# Patient Record
Sex: FEMALE | Race: WHITE
[De-identification: names, ages, dates, MRNs, and addresses within clinical notes are randomized per-mention and may not be internally consistent; named-entity substitution may affect disease eponyms.]

---

## 2017-07-21 ENCOUNTER — HOSPITAL ENCOUNTER (INPATIENT)
Dept: HOSPITAL 17 - PHED | Age: 77
LOS: 2 days | Discharge: HOME | DRG: 378 | End: 2017-07-23
Attending: HOSPITALIST | Admitting: HOSPITALIST
Payer: COMMERCIAL

## 2017-07-21 VITALS
DIASTOLIC BLOOD PRESSURE: 55 MMHG | OXYGEN SATURATION: 95 % | RESPIRATION RATE: 20 BRPM | SYSTOLIC BLOOD PRESSURE: 118 MMHG | HEART RATE: 94 BPM

## 2017-07-21 VITALS
DIASTOLIC BLOOD PRESSURE: 62 MMHG | OXYGEN SATURATION: 98 % | RESPIRATION RATE: 20 BRPM | SYSTOLIC BLOOD PRESSURE: 121 MMHG | HEART RATE: 88 BPM

## 2017-07-21 VITALS — WEIGHT: 155.65 LBS | HEIGHT: 64 IN | BODY MASS INDEX: 26.57 KG/M2

## 2017-07-21 VITALS
OXYGEN SATURATION: 99 % | SYSTOLIC BLOOD PRESSURE: 98 MMHG | TEMPERATURE: 97.8 F | HEART RATE: 138 BPM | RESPIRATION RATE: 20 BRPM | DIASTOLIC BLOOD PRESSURE: 55 MMHG

## 2017-07-21 VITALS
HEART RATE: 92 BPM | SYSTOLIC BLOOD PRESSURE: 118 MMHG | RESPIRATION RATE: 20 BRPM | DIASTOLIC BLOOD PRESSURE: 55 MMHG | OXYGEN SATURATION: 98 %

## 2017-07-21 DIAGNOSIS — C34.90: ICD-10-CM

## 2017-07-21 DIAGNOSIS — R74.0: ICD-10-CM

## 2017-07-21 DIAGNOSIS — I05.0: ICD-10-CM

## 2017-07-21 DIAGNOSIS — Z82.49: ICD-10-CM

## 2017-07-21 DIAGNOSIS — E03.9: ICD-10-CM

## 2017-07-21 DIAGNOSIS — E78.5: ICD-10-CM

## 2017-07-21 DIAGNOSIS — I10: ICD-10-CM

## 2017-07-21 DIAGNOSIS — I48.91: ICD-10-CM

## 2017-07-21 DIAGNOSIS — I65.29: ICD-10-CM

## 2017-07-21 DIAGNOSIS — Z86.010: ICD-10-CM

## 2017-07-21 DIAGNOSIS — D64.9: ICD-10-CM

## 2017-07-21 DIAGNOSIS — I25.10: ICD-10-CM

## 2017-07-21 DIAGNOSIS — I73.9: ICD-10-CM

## 2017-07-21 DIAGNOSIS — I25.2: ICD-10-CM

## 2017-07-21 DIAGNOSIS — Z95.5: ICD-10-CM

## 2017-07-21 DIAGNOSIS — G40.909: ICD-10-CM

## 2017-07-21 DIAGNOSIS — Z80.0: ICD-10-CM

## 2017-07-21 DIAGNOSIS — F41.9: ICD-10-CM

## 2017-07-21 DIAGNOSIS — I24.8: ICD-10-CM

## 2017-07-21 DIAGNOSIS — K92.2: Primary | ICD-10-CM

## 2017-07-21 DIAGNOSIS — K22.70: ICD-10-CM

## 2017-07-21 DIAGNOSIS — K31.819: ICD-10-CM

## 2017-07-21 DIAGNOSIS — F17.210: ICD-10-CM

## 2017-07-21 DIAGNOSIS — K64.8: ICD-10-CM

## 2017-07-21 DIAGNOSIS — K21.9: ICD-10-CM

## 2017-07-21 DIAGNOSIS — F32.9: ICD-10-CM

## 2017-07-21 DIAGNOSIS — Z85.828: ICD-10-CM

## 2017-07-21 LAB
ALP SERPL-CCNC: 143 U/L (ref 45–117)
ALT SERPL-CCNC: 28 U/L (ref 10–53)
ANION GAP SERPL CALC-SCNC: 9 MEQ/L (ref 5–15)
APTT BLD: 25.9 SEC (ref 24.3–30.1)
AST SERPL-CCNC: 24 U/L (ref 15–37)
BASOPHILS # BLD AUTO: 0 TH/MM3 (ref 0–0.2)
BASOPHILS NFR BLD: 0.4 % (ref 0–2)
BILIRUB SERPL-MCNC: (no result) MG/DL (ref 0.2–1)
BUN SERPL-MCNC: 16 MG/DL (ref 7–18)
CHLORIDE SERPL-SCNC: 102 MEQ/L (ref 98–107)
CK SERPL-CCNC: 77 U/L (ref 26–192)
EOSINOPHIL # BLD: 0 TH/MM3 (ref 0–0.4)
EOSINOPHIL NFR BLD: 0.8 % (ref 0–4)
ERYTHROCYTE [DISTWIDTH] IN BLOOD BY AUTOMATED COUNT: 13.9 % (ref 11.6–17.2)
GFR SERPLBLD BASED ON 1.73 SQ M-ARVRAT: 75 ML/MIN (ref 89–?)
HCO3 BLD-SCNC: 27.2 MEQ/L (ref 21–32)
HCT VFR BLD CALC: 32.3 % (ref 35–46)
HEMO FLAGS: (no result)
INR PPP: 0.9 RATIO
LYMPHOCYTES # BLD AUTO: 0.8 TH/MM3 (ref 1–4.8)
LYMPHOCYTES NFR BLD AUTO: 13.8 % (ref 9–44)
MAGNESIUM SERPL-MCNC: 1.8 MG/DL (ref 1.5–2.5)
MCH RBC QN AUTO: 29.2 PG (ref 27–34)
MCHC RBC AUTO-ENTMCNC: 32.3 % (ref 32–36)
MCV RBC AUTO: 90.5 FL (ref 80–100)
MONOCYTES NFR BLD: 8.1 % (ref 0–8)
NEUTROPHILS # BLD AUTO: 4.3 TH/MM3 (ref 1.8–7.7)
NEUTROPHILS NFR BLD AUTO: 76.9 % (ref 16–70)
PLATELET # BLD: 168 TH/MM3 (ref 150–450)
POTASSIUM SERPL-SCNC: 3.2 MEQ/L (ref 3.5–5.1)
PROTHROMBIN TIME: 10.3 SEC (ref 9.8–11.6)
RBC # BLD AUTO: 3.57 MIL/MM3 (ref 4–5.3)
SODIUM SERPL-SCNC: 138 MEQ/L (ref 136–145)
WBC # BLD AUTO: 5.5 TH/MM3 (ref 4–11)

## 2017-07-21 PROCEDURE — 85730 THROMBOPLASTIN TIME PARTIAL: CPT

## 2017-07-21 PROCEDURE — 83690 ASSAY OF LIPASE: CPT

## 2017-07-21 PROCEDURE — 93306 TTE W/DOPPLER COMPLETE: CPT

## 2017-07-21 PROCEDURE — 84484 ASSAY OF TROPONIN QUANT: CPT

## 2017-07-21 PROCEDURE — 80053 COMPREHEN METABOLIC PANEL: CPT

## 2017-07-21 PROCEDURE — 85014 HEMATOCRIT: CPT

## 2017-07-21 PROCEDURE — 84443 ASSAY THYROID STIM HORMONE: CPT

## 2017-07-21 PROCEDURE — 83880 ASSAY OF NATRIURETIC PEPTIDE: CPT

## 2017-07-21 PROCEDURE — 85018 HEMOGLOBIN: CPT

## 2017-07-21 PROCEDURE — 85610 PROTHROMBIN TIME: CPT

## 2017-07-21 PROCEDURE — C9113 INJ PANTOPRAZOLE SODIUM, VIA: HCPCS

## 2017-07-21 PROCEDURE — 85025 COMPLETE CBC W/AUTO DIFF WBC: CPT

## 2017-07-21 PROCEDURE — 82977 ASSAY OF GGT: CPT

## 2017-07-21 PROCEDURE — 82550 ASSAY OF CK (CPK): CPT

## 2017-07-21 PROCEDURE — 83735 ASSAY OF MAGNESIUM: CPT

## 2017-07-21 PROCEDURE — 71020: CPT

## 2017-07-21 PROCEDURE — 83520 IMMUNOASSAY QUANT NOS NONAB: CPT

## 2017-07-21 PROCEDURE — 93005 ELECTROCARDIOGRAM TRACING: CPT

## 2017-07-21 NOTE — PD
HPI


Chief Complaint:  GI Complaint


Time Seen by Provider:  23:07


Travel History


International Travel<30 days:  No


Contact w/Intl Traveler<30days:  No


Traveled to known affect area:  No





History of Present Illness


HPI


The patient is a 77-year-old female that complains of black bowel movements for 

one day.  The patient states she knows there are bloody because there is 

sometimes some redness mixed within the bowel movements.  She does have a 

history of chronic anemia.  She does have a history of adenocarcinoma of the 

lung and has gotten radiation therapy for this.  She is on Plavix because of 

bad circulation in the legs.  She denies any previous history of atrial 

fibrillation.  The patient also complains of a chest tightness located in the 

mid sternal region of her chest associated with shortness of breath and lasting 

about 20 minutes.  She denies any nausea, diaphoresis or radiation of the pain.

  Dr. Rivera is her cardiologist.  She is a [employer] patient of Dr. Shayan Lyles.





PFSH


Past Medical History


Hx Anticoagulant Therapy:  Yes (PLAVIX)


Arthritis:  Yes (joints)


Anxiety:  Yes


Depression:  Yes


Heart Rhythm Problems:  No


Cancer:  Yes (SKIN CANCER FACIAL AREA, left lung cancer)


Cardiac Catheterization:  No


Cardiovascular Problems:  Yes (MI, HTN, CHOL)


High Cholesterol:  Yes


Chemotherapy:  Yes


Chest Pain:  Yes


Congestive Heart Failure:  No


Coronary Artery Disease:  Yes (MI )


Diabetes:  No


Diminished Hearing:  No


Endocrine:  Yes


Gastrointestinal Disorders:  Yes


GERD:  Yes


Hepatitis:  No


Hiatal Hernia:  Yes (REFLUX)


Hypertension:  Yes


Immune Disorder:  No


Musculoskeletal:  Yes


Neurologic:  Yes


Psychiatric:  Yes


Reproductive:  No


Respiratory:  Yes


Myocardial Infarction:  Yes (X1)


Radiation Therapy:  Yes (currently for lung cancer)


Seizures:  Yes


Thyroid Disease:  Yes (HYPO)


Menopausal:  Yes


:  4


Para:  3


Miscarriage:  1





Past Surgical History


Abdominal Surgery:  Yes (STENTS PLACED TO STOMACH AS STATED 2001 R/T PVD)


Cardiac Surgery:  Yes (ANGIO-PLASTY)


Coronary Artery Bypass Graft:  No


Ear Surgery:  No


Endocrine Surgery:  No


Eye Surgery:  No


Genitourinary Surgery:  No


Gynecologic Surgery:  No


Joint Replacement:  No


Neurologic Surgery:  Yes (BRAIN ANEURYSM REPAIR)


Oral Surgery:  No


Pacemaker:  No


Thoracic Surgery:  No


Other Surgery:  Yes (ANEURYSM CLIP IN BRAIN. )





Social History


Alcohol Use:  Yes (2-3 DRINKS ONCE WEEKLY)


Tobacco Use:  Yes (1/2 ppd)


Substance Use:  No





Allergies-Medications


(Allergen,Severity, Reaction):  


Coded Allergies:  


     No Known Allergies (Verified , 17)


Reported Meds & Prescriptions





Reported Meds & Active Scripts


Active


Protonix (Pantoprazole Sodium) 40 Mg Tab 40 Mg PO BID 30 Days


Reported


Vitamin C (Ascorbic Acid) 1,000 Mg Tab.chew 1,000 Mg PO DAILY


Vitamin D-3 (Cholecalciferol) 1,000 Unit Cap 1,000 Tab PO DAILY


Multi For Her (Multiple Vitamins W/ Minerals) 1 Tab Tab 1 Tab PO DAILY


Dilantin (Phenytoin Extended) 100 Mg Cap 400 Mg PO HS


Lipitor (Atorvastatin Calcium) 80 Mg Tab 80 Mg PO HS


Zetia (Ezetimibe) 10 Mg Tab 10 Mg PO DAILY


Plavix (Clopidogrel Bisulfate) 75 Mg Tab 75 Mg PO DAILY


Levothyroxine (Levothyroxine Sodium) 150 Mcg Tab 150 Mcg PO DAILY


Norvasc (Amlodipine Besylate) 10 Mg Tab 10 Mg PO DAILY








Review of Systems


Except as stated in HPI:  all other systems reviewed are Neg





Physical Exam


Narrative


GENERAL: The patient is alert, oriented 3 in no apparent distress at this 

time.  Initially, the vital signs showed atrial fibrillation with RVR and a 

rate of 138 with blood pressure 98/55.  Later the patient converted into sinus 

rhythm with a rate of 88 and blood pressure 118/55.


SKIN: Focused skin assessment warm/dry.


HEAD: Atraumatic. Normocephalic. 


EYES: Pupils equal and round. No scleral icterus. No injection or drainage. 


ENT: No nasal bleeding or discharge.  Mucous membranes pink and moist.


NECK: Trachea midline. No JVD. 


CARDIOVASCULAR: Initial atrial fibrillation with RVR which converted to sinus 

rhythm.  No murmur appreciated.


RESPIRATORY: No accessory muscle use. Clear to auscultation. Breath sounds 

equal bilaterally. 


GASTROINTESTINAL: Abdomen soft, non-tender, nondistended. Hepatic and splenic 

margins not palpable. 


MUSCULOSKELETAL: No obvious deformities. No clubbing.  No cyanosis.  No edema. 


NEUROLOGICAL: Awake and alert. No obvious cranial nerve deficits.  Motor 

grossly within normal limits. Normal speech.


PSYCHIATRIC: Appropriate mood and affect; insight and judgment normal.





Data


Data


Last Documented VS





Vital Signs








  Date Time  Temp Pulse Resp B/P Pulse Ox O2 Delivery O2 Flow Rate FiO2


 


17 23:54   20     


 


17 23:51  88  121/62 98 Nasal Cannula  


 


17 23:18 97.8       


 


17 23:15       2 








Orders





 Electrocardiogram (17:)


B-Type Natriuretic Peptide (17:)


Ckmb (Isoenzyme) Profile (17:28)


Complete Blood Count With Diff (17:28)


Comprehensive Metabolic Panel (17:)


Magnesium (Mg) (17:)


Prothrombin Time / Inr (Pt) (17:)


Act Partial Throm Time (Ptt) (17:)


Troponin I (17:)


Lipase (17:)


Ecg Monitoring (17:)


Iv Access Insert/Monitor (17:)


Oximetry (17:)


Oxygen Administration (17:)


Sodium Chloride 0.9% Flush (Ns Flush) (17 23:30)


Chest, Pa & Lat (17:28)


Potassium Chloride (Kcl) (17 00:45)


Admit Order (Ed Use Only) (17 00:51)


Pantoprazole Inj (Protonix Inj) (17 01:00)


Admit To Inpatient (17 )


Vital Signs (Adult) Q4H (17 00:49)


Activity Oob With Assistance (17 00:49)


Cardiac Monitor / Telemetry .CONTINUOUS (17 00:49)


Intake + Output WM.QSHIFT (17 00:49)


Diet Regular Basic (17 Breakfast)


Sodium Chlor 0.9% 1000 Ml Inj (Ns 1000 M (17 00:49)


Sodium Chloride 0.9% Flush (Ns Flush) (17 01:00)


Sodium Chloride 0.9% Flush (Ns Flush) (17 09:00)


Ondansetron Inj (Zofran Inj) (17 01:00)


Comprehensive Metabolic Panel (17 06:00)


Complete Blood Count With Diff (17 06:00)


Scd Bilateral/Knee High WM.BID (17 00:49)


Bony Bilateral/Knee High WM.QSHIFT (17 00:49)


Acetaminophen (Tylenol) (17 01:00)


Acetamin-Hydrocod 325-5 Mg (Norco  5-325 (17 01:00)


Morphine Inj (Morphine Inj) (17 01:00)


Docusate Sodium-Senna (Ileana-Colace) (17 09:00)


Magnesium Hydroxide Liq (Milk Of Magnesi (17 01:00)


Sennosides (Senokot) (17 01:00)


Bisacodyl Supp (Dulcolax Supp) (17 01:00)


Lactulose Liq (Lactulose Liq) (17 01:00)


Inpatient Certification (17 )


Atorvastatin (Lipitor) (17 21:00)


Phenytoin (Dilantin) (17 21:00)





Labs





 Laboratory Tests








Test 17





 23:30


 


White Blood Count 5.5 TH/MM3


 


Red Blood Count 3.57 MIL/MM3


 


Hemoglobin 10.4 GM/DL


 


Hematocrit 32.3 %


 


Mean Corpuscular Volume 90.5 FL


 


Mean Corpuscular Hemoglobin 29.2 PG


 


Mean Corpuscular Hemoglobin 32.3 %





Concent 


 


Red Cell Distribution Width 13.9 %


 


Platelet Count 168 TH/MM3


 


Mean Platelet Volume 7.4 FL


 


Neutrophils (%) (Auto) 76.9 %


 


Lymphocytes (%) (Auto) 13.8 %


 


Monocytes (%) (Auto) 8.1 %


 


Eosinophils (%) (Auto) 0.8 %


 


Basophils (%) (Auto) 0.4 %


 


Neutrophils # (Auto) 4.3 TH/MM3


 


Lymphocytes # (Auto) 0.8 TH/MM3


 


Monocytes # (Auto) 0.4 TH/MM3


 


Eosinophils # (Auto) 0.0 TH/MM3


 


Basophils # (Auto) 0.0 TH/MM3


 


CBC Comment DIFF FINAL 


 


Differential Comment  


 


Prothrombin Time 10.3 SEC


 


Prothromb Time International 0.9 RATIO





Ratio 


 


Activated Partial 25.9 SEC





Thromboplast Time 


 


Sodium Level 138 MEQ/L


 


Potassium Level 3.2 MEQ/L


 


Chloride Level 102 MEQ/L


 


Carbon Dioxide Level 27.2 MEQ/L


 


Anion Gap 9 MEQ/L


 


Blood Urea Nitrogen 16 MG/DL


 


Creatinine 0.75 MG/DL


 


Estimat Glomerular Filtration 75 ML/MIN





Rate 


 


Random Glucose 141 MG/DL


 


Calcium Level 8.2 MG/DL


 


Magnesium Level 1.8 MG/DL


 


Total Bilirubin LESS THAN 0.1





 MG/DL


 


Aspartate Amino Transf 24 U/L





(AST/SGOT) 


 


Alanine Aminotransferase 28 U/L





(ALT/SGPT) 


 


Alkaline Phosphatase 143 U/L


 


Total Creatine Kinase 77 U/L


 


Troponin I 0.05 NG/ML


 


B-Type Natriuretic Peptide 135 PG/ML


 


Total Protein 7.4 GM/DL


 


Albumin 3.3 GM/DL


 


Lipase 246 U/L











MDM


Medical Decision Making


Medical Screen Exam Complete:  Yes


Emergency Medical Condition:  Yes


Medical Record Reviewed:  Yes


Interpretation(s)


The initial EKG showed atrial fibrillation with a ventricular rate of 135.  The 

patient spontaneously converted into sinus rhythm with occasional PVCs and rate 

of 88.  The second EKG, the 1 with normal sinus rhythm showed no acute ST 

elevation or depression.  The patient also states her chest pain resolved when 

her rhythm converted to normal sinus rhythm.  The CBC shows a hemoglobin of 

10.4 and hematocrit of 32.3 with 77% neutrophils but is otherwise unremarkable.

  The complete metabolic profile shows potassium of 3.2, glucose 141, calcium 

8.2 with alkaline phosphatase 143 and albumen 3.3 but is otherwise 

unremarkable.  The BNP is 135 and the troponin I and total CK are normal.  The 

lipase is normal.  The coagulation profile is normal.  The chest x-ray shows no 

acute disease.


Differential Diagnosis


Anemia, upper GI bleed, acute coronary syndrome, atrial fibrillation with rapid 

ventricular response, electrolyte disorder,


Narrative Course


The patient has chest pain and associated with her atrial fibrillation.  This 

is the first time she has had atrial fibrillation.  She does have a mild 

hypokalemia as well.





Physician Communication


Physician Communication


I discussed the patient with Dr. Canada, the patient will be admitted to her as 

GI bleed with anemia and atrial fibrillation with RVR.





Diagnosis





 Primary Impression:  


 Upper GI bleed


 Additional Impressions:  


 Anemia


 Atrial fibrillation with RVR





Admitting Information


Admitting Physician Requests:  Admit








Luis Eduardo Huddleston MD 2017 23:38

## 2017-07-21 NOTE — RADRPT
EXAM DATE/TIME:  07/21/2017 23:33 

 

[facility] COMPARISON:     

CHEST PA & LAT, September 14, 2015, 20:54.  CHEST SINGLE AP, October 27, 2016, 15:08.

 

                     

INDICATIONS :     

Chest pain.

                     

 

MEDICAL HISTORY :     

None.          

 

SURGICAL HISTORY :     

None.   

 

ENCOUNTER:     

Initial                                        

 

ACUITY:     

1 day      

 

PAIN SCORE:     

7/10

 

LOCATION:     

Bilateral chest 

 

FINDINGS:     

There is slight scarring in the left lung base. Right lung is clear. Cardiomediastinal contours are s
table and satisfactory. Thoracic skeleton is grossly intact.

 

CONCLUSION:     No acute disease

 

 

 

 Da Lao MD on July 21, 2017 at 23:51           

Board Certified Radiologist.

 This report was verified electronically.

## 2017-07-22 VITALS
DIASTOLIC BLOOD PRESSURE: 47 MMHG | SYSTOLIC BLOOD PRESSURE: 94 MMHG | HEART RATE: 75 BPM | RESPIRATION RATE: 20 BRPM | OXYGEN SATURATION: 97 %

## 2017-07-22 VITALS
TEMPERATURE: 97.4 F | SYSTOLIC BLOOD PRESSURE: 150 MMHG | DIASTOLIC BLOOD PRESSURE: 58 MMHG | RESPIRATION RATE: 18 BRPM | HEART RATE: 79 BPM | OXYGEN SATURATION: 98 %

## 2017-07-22 VITALS
HEART RATE: 70 BPM | TEMPERATURE: 97 F | RESPIRATION RATE: 20 BRPM | OXYGEN SATURATION: 100 % | DIASTOLIC BLOOD PRESSURE: 56 MMHG | SYSTOLIC BLOOD PRESSURE: 134 MMHG

## 2017-07-22 VITALS
OXYGEN SATURATION: 100 % | TEMPERATURE: 97 F | HEART RATE: 71 BPM | DIASTOLIC BLOOD PRESSURE: 54 MMHG | SYSTOLIC BLOOD PRESSURE: 130 MMHG | RESPIRATION RATE: 18 BRPM

## 2017-07-22 VITALS
DIASTOLIC BLOOD PRESSURE: 71 MMHG | TEMPERATURE: 97.3 F | OXYGEN SATURATION: 95 % | HEART RATE: 81 BPM | SYSTOLIC BLOOD PRESSURE: 145 MMHG | RESPIRATION RATE: 18 BRPM

## 2017-07-22 VITALS
DIASTOLIC BLOOD PRESSURE: 54 MMHG | TEMPERATURE: 97.9 F | HEART RATE: 74 BPM | OXYGEN SATURATION: 98 % | RESPIRATION RATE: 16 BRPM | SYSTOLIC BLOOD PRESSURE: 125 MMHG

## 2017-07-22 VITALS
OXYGEN SATURATION: 98 % | HEART RATE: 73 BPM | RESPIRATION RATE: 20 BRPM | DIASTOLIC BLOOD PRESSURE: 52 MMHG | SYSTOLIC BLOOD PRESSURE: 113 MMHG

## 2017-07-22 VITALS — SYSTOLIC BLOOD PRESSURE: 108 MMHG | DIASTOLIC BLOOD PRESSURE: 72 MMHG

## 2017-07-22 VITALS — HEART RATE: 82 BPM

## 2017-07-22 VITALS — HEART RATE: 75 BPM

## 2017-07-22 LAB
ALP SERPL-CCNC: 127 U/L (ref 45–117)
ALT SERPL-CCNC: 23 U/L (ref 10–53)
ANION GAP SERPL CALC-SCNC: 6 MEQ/L (ref 5–15)
AST SERPL-CCNC: 23 U/L (ref 15–37)
BASOPHILS # BLD AUTO: 0 TH/MM3 (ref 0–0.2)
BASOPHILS NFR BLD: 0.9 % (ref 0–2)
BILIRUB SERPL-MCNC: 0.2 MG/DL (ref 0.2–1)
BUN SERPL-MCNC: 14 MG/DL (ref 7–18)
CHLORIDE SERPL-SCNC: 107 MEQ/L (ref 98–107)
EOSINOPHIL # BLD: 0 TH/MM3 (ref 0–0.4)
EOSINOPHIL NFR BLD: 0.8 % (ref 0–4)
ERYTHROCYTE [DISTWIDTH] IN BLOOD BY AUTOMATED COUNT: 13.6 % (ref 11.6–17.2)
GFR SERPLBLD BASED ON 1.73 SQ M-ARVRAT: 101 ML/MIN (ref 89–?)
HCO3 BLD-SCNC: 28.5 MEQ/L (ref 21–32)
HCT VFR BLD CALC: 28.6 % (ref 35–46)
HCT VFR BLD CALC: 28.8 % (ref 35–46)
HCT VFR BLD CALC: 29.8 % (ref 35–46)
HCT VFR BLD CALC: 30.2 % (ref 35–46)
HEMO FLAGS: (no result)
LYMPHOCYTES # BLD AUTO: 0.8 TH/MM3 (ref 1–4.8)
LYMPHOCYTES NFR BLD AUTO: 16.8 % (ref 9–44)
MCH RBC QN AUTO: 28.9 PG (ref 27–34)
MCHC RBC AUTO-ENTMCNC: 32.1 % (ref 32–36)
MCV RBC AUTO: 90 FL (ref 80–100)
MONOCYTES NFR BLD: 9.6 % (ref 0–8)
NEUTROPHILS # BLD AUTO: 3.5 TH/MM3 (ref 1.8–7.7)
NEUTROPHILS NFR BLD AUTO: 71.9 % (ref 16–70)
PLATELET # BLD: 160 TH/MM3 (ref 150–450)
POTASSIUM SERPL-SCNC: 4.1 MEQ/L (ref 3.5–5.1)
RBC # BLD AUTO: 3.31 MIL/MM3 (ref 4–5.3)
REVIEW FLAG: (no result)
SCAN/DIFF: (no result)
SODIUM SERPL-SCNC: 141 MEQ/L (ref 136–145)
WBC # BLD AUTO: 4.7 TH/MM3 (ref 4–11)

## 2017-07-22 RX ADMIN — PHENYTOIN SODIUM SCH MLS/HR: 50 INJECTION INTRAMUSCULAR; INTRAVENOUS at 17:33

## 2017-07-22 RX ADMIN — STANDARDIZED SENNA CONCENTRATE AND DOCUSATE SODIUM SCH TAB: 8.6; 5 TABLET, FILM COATED ORAL at 08:15

## 2017-07-22 RX ADMIN — PANTOPRAZOLE SCH MG: 40 TABLET, DELAYED RELEASE ORAL at 21:04

## 2017-07-22 RX ADMIN — PANTOPRAZOLE SODIUM SCH MG: 40 INJECTION, POWDER, FOR SOLUTION INTRAVENOUS at 13:16

## 2017-07-22 RX ADMIN — STANDARDIZED SENNA CONCENTRATE AND DOCUSATE SODIUM SCH TAB: 8.6; 5 TABLET, FILM COATED ORAL at 21:00

## 2017-07-22 RX ADMIN — PHENYTOIN SODIUM SCH MLS/HR: 50 INJECTION INTRAMUSCULAR; INTRAVENOUS at 01:01

## 2017-07-22 RX ADMIN — PANTOPRAZOLE SODIUM SCH MG: 40 INJECTION, POWDER, FOR SOLUTION INTRAVENOUS at 01:08

## 2017-07-22 RX ADMIN — Medication SCH ML: at 08:15

## 2017-07-22 RX ADMIN — PHENYTOIN SODIUM SCH MLS/HR: 50 INJECTION INTRAMUSCULAR; INTRAVENOUS at 13:16

## 2017-07-22 RX ADMIN — Medication SCH ML: at 21:05

## 2017-07-22 NOTE — PD.CONS
GI Consult


GI Consult








SEE FORMAL CONSULTATION DICTATED TODAY ALSO ([id number])


      


ASSESSMENT/PLAN:  





1. Melena-no BM's today


2. Anemia-stable


3. hx of gastric and small bowel AVM's


4. GAVE (watermelon stomach)


5. CP with A. Fib and elevated troponin 


6. Alcantara's esoph


7. Colon polyps


8. Elevated Alk PO4-not new





PLAN:


1. GGTP,AMA


2. Cont PPI


3. EGD with APC (cautery device) once stable from the cardiac standpoint. She 

will need to transferred to the main hospital since the APC device is there.





It was a pleasure seeing Annalisa Ruiz  Thank you for this consult.


Entered by: Norman Donohue MD Jul 22, 2017 13:25

## 2017-07-22 NOTE — MB
cc:

KELSY PHILLIPS MD

****

 

 

DATE OF CONSULTATION

7/22/17

 

REASON FOR CONSULTATION

Atrial fibrillation.

 

HISTORY OF PRESENT ILLNESS

Ms. Ruiz is a 76-year-old patient of mine who does have a history of

hypertension, hyperlipidemia, CAD, who presented to the emergency room with

progressive shortness of breath and fatigue.  Further evaluation revealed both

atrial fibrillation with rapid ventricular rate and GI bleed.  The patient is

feeling some better this afternoon and denies any cardiac complaints.

 

PAST MEDICAL HISTORY

Significant for hypertension, hyperlipidemia, CAD, PAD, anxiety, depression,

lung cancer status post radiation and in remission, hypothyroidism, GERD with

Alcantara's esophagus, GI bleed from AVMs.

 

PAST SURGICAL HISTORY

Her surgical history included brain aneurysm clipping in 1994 and left leg

bypass.

 

MEDICATIONS

Outpatient medications include:

1. Protonix.

2. Vitamins.

3. Dilantin.

4. Lipitor.

5. Zetia.

6. Plavix.

7. Levothyroxine.

8. Norvasc.

 

ALLERGIES

NO KNOWN DRUG ALLERGIES.

 

FAMILY HISTORY

Positive for CAD.

 

SOCIAL HISTORY

The patient does not smoke.

 

REVIEW OF SYSTEMS

Except as mentioned in the HPI all 12 systems are negative.

 

PHYSICAL EXAMINATION

VITAL SIGNS: 97.4, 79, 18, 150/58.

GENERAL: In general she is a well-appearing female who is in no apparent

distress.

NECK: Her neck is free from JVD.

LUNGS: The lungs are bilaterally clear to auscultation.  CARDIOVASCULAR: On

examination she has normal S1-S2. Did not appreciate any murmurs, rubs or

gallops.

ABDOMEN:  The abdomen is soft.

EXTREMITIES: Free from edema.

 

LABORATORY FINDINGS

Significant for hemoglobin 9.4.  Her creatinine is 0.58 and peak troponin is

0.88. TSH is 2.1.

 

Stress test from 10/28/2016 shows borderline results with minimal inferior

redistribution

 

Telemetry currently shows normal sinus rhythm.

 

IMPRESSION

New onset atrial fibrillation-the patient did have RVR with a heart rate of 135

in the emergency room.  She was changed to Cardizem and is currently in sinus

rhythm.  The patient did have an echocardiogram today which showed normal LV

function.  At this point I would continue her on the Cardizem for rate control.

She is not a candidate for anticoagulation in light of her GI bleeding.

 

Elevated cardiac enzymes-this is felt to be a secondary MI from her a-fib with

RVR and anemia.  Additionally, the patient had a nuclear stress test in the

fall that was borderline for ischemia. Thus, at this point I would not pursue

any further workup.

 

GI bleed-the patient's Plavix and aspirin should continue to be held.

 

DISPOSITION

I think it is reasonable from a CV perspective for her to be discharged home.

She can follow up with me as an outpatient.

 

 

 

                              _________________________________

                              Kelsy Phillips M.D.

 

 

 

IGNACIO/ANDREA

D:  7/22/2017/6:32 PM

T:  7/22/2017/6:53 PM

Visit #:  [id number]

Job #:  [id number]

## 2017-07-22 NOTE — HHI.HP
__________________________________________________





HPI


Service


[facility]


Primary Care Physician


Shayan Lyles MD


Admission Diagnosis


GI bleed with anemia, A. fib with RVR


Diagnoses:  


Travel History


International Travel<30 Days:  No


Contact w/Intl Traveler <30 Da:  No


Traveled to Known Affected Are:  No


History of Present Illness


Pleasant 77-year-old female with a history of hypertension, coronary artery 

disease, hypothyroidism, GI bleeding from AVMs, who presents with a 2 day 

history of intermittent sharp sternal chest pain, accompanied with shortness of 

breath and fatigue.  Patient reports several episodes of sharp chest pain which 

last about 45 minutes, accompanied by severe fatigue and shortness of breath.  

She had an episode last night, prompting her presentation, however this has 

resolved.  The ER she was found to have atrial fibrillation with RVR.  

Spontaneously returned to sinus rhythm.  She reports currently feeling all 

right.  She also reports a large melanotic stool yesterday morning, together 

with blood on toilet paper.  She denies any bowel movements like this over the 

past several months, it is concern for recurrence of GI bleed.  She denies any 

fevers, chills, nausea, vomiting, lightheadedness, dizziness.  Denies any 

swelling in her extremities.





Review of Systems


Performed and negative except for history of present illness and past medical 

history.





Past Family Social History


Past Medical History


Peripheral arterial disease.  Carotid stenosis.


Hypertension


Hyperlipidemia


Coronary artery disease


Anxiety


Depression


Skin cancer


Lung cancer status post radiation.  Reportedly in remission


Hypothyroidism


GERD with history of Alcantara's esophagus


GI bleed from AVMs.


Past Surgical History


Brain aneurysm clipped in 1994


Vascular bypass left leg.


Reported Medications


Reported Meds & Active Scripts


Active


Protonix (Pantoprazole Sodium) 40 Mg Tab 40 Mg PO BID 30 Days


Reported


Vitamin C (Ascorbic Acid) 1,000 Mg Tab.chew 1,000 Mg PO DAILY


Vitamin D-3 (Cholecalciferol) 1,000 Unit Cap 1,000 Tab PO DAILY


Multi For Her (Multiple Vitamins W/ Minerals) 1 Tab Tab 1 Tab PO DAILY


Dilantin (Phenytoin Extended) 100 Mg Cap 400 Mg PO HS


Lipitor (Atorvastatin Calcium) 80 Mg Tab 80 Mg PO HS


Zetia (Ezetimibe) 10 Mg Tab 10 Mg PO DAILY


Plavix (Clopidogrel Bisulfate) 75 Mg Tab 75 Mg PO DAILY


Levothyroxine (Levothyroxine Sodium) 150 Mcg Tab 150 Mcg PO DAILY


Norvasc (Amlodipine Besylate) 10 Mg Tab 10 Mg PO DAILY


Allergies:  


Coded Allergies:  


     No Known Allergies (Verified , 7/22/17)


Family History





Father passed away at age 54 secondary to heart attack.  Mother had heart 

disease, passed away from liver cancer


Social History





Father passed away at age 54 secondary to heart attack.  Mother had heart 

disease, passed away from liver cancer





Physical Exam


Vital Signs





 Vital Signs








  Date Time  Temp Pulse Resp B/P Pulse Ox O2 Delivery O2 Flow Rate FiO2


 


7/22/17 13:46  75      


 


7/22/17 12:00 97.9 74 16 125/54 98   


 


7/22/17 08:00 97.0 71 18 130/54 100   


 


7/22/17 05:30 97.0 70 20 134/56 100   


 


7/22/17 05:15  77 20 108/72 97   


 


7/22/17 03:15  75 20 94/47 97 Nasal Cannula 2 


 


7/22/17 01:15  73 20 113/52 98   


 


7/21/17 23:54   20     


 


7/21/17 23:51  88 20 121/62 98 Nasal Cannula  


 


7/21/17 23:30  94 20 118/55 95 Room Air  


 


7/21/17 23:26  92 20 118/55 98   


 


7/21/17 23:18 97.8 138 20 98/55 99   


 


7/21/17 23:15     98 Nasal Cannula 2 








Physical Exam


GENERAL: This is a well-nourished, well-developed patient, in no apparent 

distress.  Alert and oriented 3.


SKIN: No rashes, ecchymoses or lesions. Cool and dry.


HEAD: Atraumatic. Normocephalic. No temporal or scalp tenderness.


EYES: Pupils equal round and reactive. Extraocular motions intact. No scleral 

icterus. No injection or drainage. 


ENT: Nose without bleeding, purulent drainage or septal hematoma. Throat 

without erythema, tonsillar hypertrophy or exudate. Uvula midline. Airway 

patent.


NECK: Trachea midline. No JVD or lymphadenopathy. Supple, nontender, no 

meningeal signs.


CARDIOVASCULAR: Regular rate and rhythm without murmurs, gallops, or rubs.  

Positive bruit right carotid.  Patient says this is consistent with previous 

carotid stenosis.


RESPIRATORY: Clear to auscultation. Breath sounds equal bilaterally. No wheezes

, rales, or rhonchi.  


GASTROINTESTINAL: Abdomen soft, non-tender, nondistended. No hepato-splenomegaly

, or palpable masses. No guarding.


MUSCULOSKELETAL: Extremities without clubbing, cyanosis, or edema. No joint 

tenderness, effusion, or edema noted. No calf tenderness. Negative Homans sign 

bilaterally.


NEUROLOGICAL: Awake and alert. Cranial nerves II through XII intact.  Motor and 

sensory grossly within normal limits. Five out of 5 muscle strength in all 

muscle groups.  Normal speech.


Laboratory





Laboratory Tests








Test 7/21/17 7/22/17 7/22/17 7/22/17





 23:30 06:14 09:50 11:59


 


White Blood Count 5.5  4.7   


 


Red Blood Count 3.57  3.31   


 


Hemoglobin 10.4  9.6  9.7  


 


Hematocrit 32.3  29.8  30.2  


 


Mean Corpuscular Volume 90.5  90.0   


 


Mean Corpuscular Hemoglobin 29.2  28.9   


 


Mean Corpuscular Hemoglobin 32.3  32.1   





Concent    


 


Red Cell Distribution Width 13.9  13.6   


 


Platelet Count 168  160   


 


Mean Platelet Volume 7.4  7.5   


 


Neutrophils (%) (Auto) 76.9  71.9   


 


Lymphocytes (%) (Auto) 13.8  16.8   


 


Monocytes (%) (Auto) 8.1  9.6   


 


Eosinophils (%) (Auto) 0.8  0.8   


 


Basophils (%) (Auto) 0.4  0.9   


 


Neutrophils # (Auto) 4.3  3.5   


 


Lymphocytes # (Auto) 0.8  0.8   


 


Monocytes # (Auto) 0.4  0.4   


 


Eosinophils # (Auto) 0.0  0.0   


 


Basophils # (Auto) 0.0  0.0   


 


CBC Comment DIFF FINAL  AUTO DIFF   


 


Differential Comment   AUTO DIFF  





  CONFIRMED  


 


Prothrombin Time 10.3    


 


Prothromb Time International 0.9    





Ratio    


 


Activated Partial 25.9    





Thromboplast Time    


 


Sodium Level 138  141   


 


Potassium Level 3.2  4.1   


 


Chloride Level 102  107   


 


Carbon Dioxide Level 27.2  28.5   


 


Anion Gap 9  6   


 


Blood Urea Nitrogen 16  14   


 


Creatinine 0.75  0.58   


 


Estimat Glomerular Filtration 75  101   





Rate    


 


Random Glucose 141  75   


 


Calcium Level 8.2  8.2   


 


Magnesium Level 1.8    


 


Total Bilirubin LESS THAN 0.1  0.2   


 


Aspartate Amino Transf 24  23   





(AST/SGOT)    


 


Alanine Aminotransferase 28  23   





(ALT/SGPT)    


 


Alkaline Phosphatase 143  127   


 


Total Creatine Kinase 77    


 


Troponin I 0.05  0.88   0.73 


 


B-Type Natriuretic Peptide 135    


 


Total Protein 7.4  6.8   


 


Albumin 3.3  3.2   


 


Lipase 246    








Result Diagram:  


7/22/17 0950                                                                   

             7/22/17 0614





Imaging





Last Impressions








Chest X-Ray 7/21/17 [id number] Signed





Impressions: 





 Service Date/Time:  Friday, July 21, 2017 23:33 - CONCLUSION: No acute disease

   





   Da Lao MD 











Assessment and Plan


Assessment and Plan





//Chest pain.  Acute on admission.  Currently resolved


///Atrial fibrillation with RVR.  New-onset.


//History of coronary artery disease.


//Troponin elevation.  0.88, decreased now to 0.73.


//Hyperlipidemia


//Prophylaxis disease


-Holding Plavix in setting of anemia with suspected GI bleed.


-Spontaneous resolution of atrial fibrillation.  Diltiazem started for 

suppression.  Hold home amlodipine.


-Troponin elevation is likely demand ischemia from atrial fibrillation.  

Primary cardiologist consulted.  Echocardiogram pending.  Limit AC in setting 

of suspected GI bleed.  TSH pending.





//Seizure disorder.  Appeared continue home medications





//Hypertension.  Hold amlodipine.  Start diltiazem for atrial fibrillation





//Chronic anemia.


//Transaminitis.


//GERD with history of Alcantara's esophagus


//Melena


-Hemoglobin in the nines.  Monitor.


-Continue home PPI.


-Gastroenterology following.





//Chronic smoking.  Cessation counseling provided.  Cessation strongly advised.





//Hypothyroidism.  Chronic.  Repeat TSH in setting of atrial fibrillation.





//History of lung cancer.  Status post remission per patient.





//DVT prophylaxis.  SCDs Hold AC of the setting of suspected GI bleed.


Discussed Condition With


Patient, nurse gastroenterology.





Physician Certification


2 Midnight Certification Type:  Admission for Inpatient Services


Order for Inpatient Services


The services are ordered in accordance with Medicare regulations or non-

Medicare payer requirements, as applicable.  In the case of services not 

specified as inpatient-only, they are appropriately provided as inpatient 

services in accordance with the 2-midnight benchmark.


Estimated LOS (days):  2


 days is the estimated time the patient will need to remain in the hospital, 

assuming treatment plan goals are met and no additional complications.


Post-Hospital Plan:  Not yet determined








Meliton Malcolm MD Jul 22, 2017 15:29

## 2017-07-22 NOTE — ECHRPT
Indication:   ATRIAL FIBRILLATION

 

 CONCLUSIONS

 Normal left ventricular size. 

 Mild concentric left ventricular hypertrophy. 

 The left ventricular systolic function is normal with an estimated ejection fraction in the range of
 55-60%. 

 No regional wall motion abnormalities are present. 

 

 The left atrial size is mild to moderately dilated. 

 Mild thickening of the mitral valve leaflets. 

 Mild to moderate mitral valve regurgitation. 

 Has a rheumatic appearance to the valve.moderate mitral annular calcification. 

 Mild to moderatemitral valve mean gradient is 10 mmHg. 

 Restricted mobility of the anterior mitral valve leaflet probably secondary to the calcification. 

 

  mitral valve stenosis. 

 Moderate thickening of the aortic valve leaflets. 

 Trace aortic valve regurgitation. 

 Mild aortic valve stenosis. 

 Aortic valve area is 1.3 cm. 

 Aortic valve mean gradient is 12 mmHg. 

 

 BP:  134   / 56      HR: 76                       Rhythm:           Sinus

 

 MEASUREMENTS  (Male / Female) Normal Values       Technical Quality:Fair

 2D ECHO

 LV Diastolic Diameter PLAX        4.7 cm                4.2 - 5.9 / 3.9 - 5.3 cm

 LV Systolic Diameter PLAX         3.5 cm                

 IVS Diastolic Thickness           1.1 cm                0.6 - 1.0 / 0.6 - 0.9 cm

 LVPW Diastolic Thickness          1.1 cm                0.6 - 1.0 / 0.6 - 0.9 cm

 LV Relative Wall Thickness        0.5                   

 LVOT Diameter                     2.1 cm                

 Aortic Root Diameter              3.1 cm                

 LA Systolic Diameter LX           3.8 cm                3.0 - 4.0 / 2.7 - 3.8 cm

 

 M-MODE

 AV Cusp Separation MM             1.7 cm                

 

 DOPPLER

 AV Peak Velocity                  209.0 cm/s            

 AV Peak Gradient                  17.5 mmHg             

 AV Mean Gradient                  12.0 mmHg             

 AV Velocity Time Integral         55.4 cm               

 LVOT Peak Velocity                87.7 cm/s             

 LVOT Peak Gradient                3.1 mmHg              

 LVOT Velocity Time Integral       21.1 cm               

 LVOT Cardiac Index                3167.3 cm/minm     

 AV Area Cont Eq vti               1.3 cm               

 AV Area Cont Eq pk                1.5 cm               

 MV Peak Velocity                  234.0 cm/s            

 MV Peak Gradient                  21.9 mmHg             

 MV Mean Velocity                  152.0 cm/s            

 MV Mean Gradient                  10.0 mmHg             

 MV Area PHT                       2.4 cm               

 Mitral E Point Velocity           202.0 cm/s            

 Mitral A Point Velocity           132.0 cm/s            

 Mitral E to A Ratio               1.5                   

 LV E' Lateral Velocity            8.1 cm/s              

 Mitral E to LV E' Lateral Ratio   24.9                  

 LV E' Septal Velocity             4.7 cm/s              

 Mitral E to LV E' Septal Ratio    43.2                  

 PV Peak Velocity                  58.7 cm/s             

 PV Peak Gradient                  1.4 mmHg              

 

 

 FINDINGS

 

 LEFT VENTRICLE

 Normal left ventricular size. 

 Mild concentric left ventricular hypertrophy. 

 The left ventricular systolic function is normal with an estimated ejection fraction in the range of
 55-60%. 

 No regional wall motion abnormalities are present. 

 Left ventricular diastolic function parameters are normal. 

 

 RIGHT VENTRICLE

 Normal right ventricular size and systolic function.  

 

 LEFT ATRIUM

 The left atrial size is mild to moderately dilated. 

 

 RIGHT ATRIUM

 The right atrial size is normal.  

 

 ATRIAL SEPTUM

 Normal atrial septal thickness without atrial level shunting by limited color doppler interrogation.
  

 

 AORTA

 The aortic root and proximal ascending aorta are normal in size on limited imaging.  

 

 MITRAL VALVE

 Mild thickening of the mitral valve leaflets. 

 Mild to moderate mitral valve regurgitation. 

 Has a rheumatic appearance to the valve.moderate mitral annular calcification. 

 Mild to moderatemitral valve mean gradient is 10 mmHg. 

 Restricted mobility of the anterior mitral valve leaflet probably secondary to the calcification. 

 

  mitral valve stenosis. 

 

 AORTIC VALVE

 Moderate thickening of the aortic valve leaflets. 

 Trace aortic valve regurgitation. 

 Mild aortic valve stenosis. 

 Aortic valve area is 1.3 cm. 

 Aortic valve mean gradient is 12 mmHg. 

 

 TRICUSPID VALVE

 Structurally normal tricuspid valve. No tricuspid valve stenosis or regurgitation.  

 

 PULMONARY VALVE

 The pulmonary valve is not well visualized.  

 

 VESSELS

 The inferior vena cava is normal in size.  

 

 PERICARDIUM

 No pericardial effusion.  

 

 

 

 

  Mahnaz Rivera MD, FACC

  (Electronically Signed)

  Final Date:22 July 2017 16:36

## 2017-07-22 NOTE — MB
cc:

MOLLY BENITES,SANIYA MORAN MD, M.D.

****

 

 

DATE OF CONSULTATION:  07/22/2017.

 

REASON FOR CONSULTATION:

I have been asked to see the patient at the request of Dr. Malcolm for

evaluation GI bleed.

 

REFERRING PHYSICIAN:

Dr. Meliton Malcolm.

 

YOB: 1940.

 

HISTORY OF PRESENT ILLNESS:

The patient is a pleasant 77-year-old white female with history of coronary

artery disease as well as lung cancer and is undergoing radiation therapy. She

also has a history of gastric and jejunal AVMs which have been cauterized, the

latter on a double balloon enteroscopy in [place].  In addition, her gastric

AVMs as well as one in her stomach has been cauterized in our community with

Argon Plasma Coagulator.

 

Yesterday she had one black bowel movement and at the same time she thought she

had heartburn but it was more chest tightness in the mid sternal area.  In the

emergency room, she did have fibrillation and apparently this is better now.

 

At this time, she feels well.  There is no more chest pain. She has not had any

more bowel movements or bleeding.  There has been no dysphagia, odynophagia,

early satiety.  Her heartburn is well controlled with Protonix. No

hematochezia, diarrhea or constipation, fever or chills, abdominal pain.

 

She does take a proton pump inhibitor daily. No aspirin.  She does take Plavix.

 

 

PAST MEDICAL HISTORY:

1. Lung cancer.

2. She has had small bowel AVMs.

3. Jejunal AVMs.

4. Gastric AVMs.

5. Alcantara's esophagus.

6. Gastroesophageal reflux disease (GERD).

7. Dyslipidemia.

8. Skin cancer.

9. Hypertension.

10. Seizure disorder.

11. Hypothyroidism.

12. Elevated alkaline phosphatase.

13. Watermelon stomach.

14. Coronary artery disease.

15. Atrial fibrillation, which is new.

16. A myocardial infarction in the past.

17. Colonic diverticulosis.

18. Serrated adenomatous polyps.

19. Internal hemorrhoids.

 

PAST SURGICAL HISTORY:

1. Cardiac stents.

2. Angioplasty.

3. A brain aneurysm surgery and she had a clip placed.

4. Upper endoscopies.

5. Colonoscopies.

6. Double balloon enteroscopies.

 

SOCIAL HISTORY:

She does smoke about half a pack a day. She drinks alcohol a few times a week.

 

 

ALLERGIES:

NO KNOWN DRUG ALLERGIES.

 

OUTPATIENT MEDICATIONS:

1. Norvasc.

2. Levothyroxine.

3. Plavix.

4. Lipitor.

5. Zetia.

6. Dilantin.

7. A multivitamin.

8. Protonix.

 

REVIEW OF SYSTEMS:

CONSTITUTIONAL: No weight loss or fever or chills.

CARDIOPULMONARY: No palpitations or wheezing. Her chest pain has resolved. No

shortness of breath.

GASTROINTESTINAL: Please see above.

 

Otherwise an unremarkable ten-point of systems.

 

FAMILY HISTORY:

Significant for a brother with colon polyps. Mother with liver cancer.

 

INPATIENT MEDICATIONS:

1. Lipitor.

2. Dilantin.

3. Ileana-Colace.

4. Protonix.

5. Zofran.

6. Tylenol.

7. Norco.

8. Morphine.

9. Milk of magnesia.

10. Senokot.

11. Dulcolax suppositories.

12. Lactulose.

 

PHYSICAL EXAMINATION:

VITAL SIGNS:  Blood pressure is 125/54, pulse 74, respiratory rate 16,

temperature 97.9.

GENERAL:  In general, she is an elderly white female resting comfortably at

this time and appears to be in no acute GI distress.

HEAD, EYES, EARS, NOSE, THROAT: Pupils equal, round and reactive to light. No

obvious scleral icterus. The oropharynx had dental caries. No tongue deviation.

No Candidal lesions.

NECK: The neck is supple. No thyromegaly or lymphadenopathy.

LUNGS: Clear to auscultation and percussion.

HEART: Regular rate and rhythm. No gross murmurs are heard.

ABDOMEN: Abdomen soft, nondistended and nontender. No organomegaly, no masses.

Bowel sounds in all four quadrants.

RECTAL: Not done.

EXTREMITIES: No cyanosis, clubbing or edema.

CNS: Grossly intact. No gross cranial nerve deficits. No gross sensory or motor

deficits.  She is alert and oriented times three.

SKIN: Warm and moist.

 

DATABASE:

Her hemoglobin on admission was 10.4 with hematocrit of 32.3, white blood cell

count 5200, MCV of 90.5, platelet count of 168,000. Early this morning at 6:14

a.m., hemoglobin was 9.6. About 10:00 a.m. this morning, hemoglobin was 9.7.

 

Her prothrombin time is 10.3.  INR is 0.9.  PTT of 25.9.

 

BUN 15, creatinine 0.75, potassium of 3.2 and now it is 4.1, sodium 141.

 

Total bilirubin of 0.2 with an SGOT of 23, SGPT of 23, all normal.

 

Alkaline phosphatase elevated at 127.

 

Her CPK was 77.

 

Troponin was 0.05.  Her troponin is not elevated at 0.88 and 0.73.

 

Her lipase is 246 and is normal.

 

Albumin is slightly low at 3.3.  Total protein is 7.4 and is normal.

 

IMPRESSION:

1. Melena - This occurred one time. She has had no more bowel movements and no

bleeding since yesterday. Her son, who was in the room, does understand that I

am worried about an upper GI bleed. I suspect that these are recurrent gastric

AVMs, small bowel AVMs.  This could also be watermelon stomach -- see below for

details.  She does not take any aspirin, NSAIDs that would point to peptic

ulcer disease but this cannot be excluded.

 

 

2. Anemia - stable.

 

3. History of gastric and small bowel AVMs (jejunal) - these were cauterized in

the past.

 

4. History of GAVE (watermelon stomach) - this was cauterized in the past.

 

5. History of chest pain with atrial fibrillation and now an elevated

troponin.

 

6. Alcantara's esophagus.

 

7. Colon polyps - her last colonoscopy was in January of 2017 and it showed a

serrated adenoma, internal hemorrhoids and colonic diverticulosis.

 

8. Elevated alkaline phosphatase. This has been elevated in the past and this

is not new.

 

 

 

PLAN:

1. Continue proton pump inhibitor.

2. Please check GGTP and antimitochondrial antibody.

3. The patient does need an upper endoscopy with Argon Plasma Cautery when

   stable from the cardiac standpoint; however, she will need to be transferred

   to the main hospital since the APC device is there and not at the [facility].

4. We did discuss the indications, risks, complications, benefits, alternatives

   and limitations in regards to the upper endoscopy including the risk of

   bleeding, perforation, infection, arrhythmias, and the small possibility of

   death. She understands if she is bleeding from the small bowel this upper

   endoscopy would not see that area.

 

5. Further recommendations depending on how she does.

 

The case was discussed with Dr. Malcolm.

 

 

 

 

                              _________________________________

                              MD JUDE Nguyen/DEVANG

D:  7/22/2017/1:34 PM

T:  7/22/2017/2:06 PM

Visit #:  [id number]

Job #:  [id number]

[facility]

## 2017-07-23 VITALS
RESPIRATION RATE: 22 BRPM | HEART RATE: 69 BPM | OXYGEN SATURATION: 96 % | SYSTOLIC BLOOD PRESSURE: 132 MMHG | DIASTOLIC BLOOD PRESSURE: 56 MMHG | TEMPERATURE: 97.4 F

## 2017-07-23 VITALS
RESPIRATION RATE: 18 BRPM | DIASTOLIC BLOOD PRESSURE: 58 MMHG | HEART RATE: 68 BPM | SYSTOLIC BLOOD PRESSURE: 127 MMHG | OXYGEN SATURATION: 97 % | TEMPERATURE: 97.2 F

## 2017-07-23 VITALS
DIASTOLIC BLOOD PRESSURE: 57 MMHG | HEART RATE: 71 BPM | OXYGEN SATURATION: 96 % | SYSTOLIC BLOOD PRESSURE: 116 MMHG | TEMPERATURE: 97.3 F | RESPIRATION RATE: 20 BRPM

## 2017-07-23 LAB
HCT VFR BLD CALC: 28.1 % (ref 35–46)
HCT VFR BLD CALC: 30.2 % (ref 35–46)
REVIEW FLAG: (no result)
REVIEW FLAG: (no result)

## 2017-07-23 RX ADMIN — STANDARDIZED SENNA CONCENTRATE AND DOCUSATE SODIUM SCH TAB: 8.6; 5 TABLET, FILM COATED ORAL at 08:56

## 2017-07-23 RX ADMIN — PANTOPRAZOLE SCH MG: 40 TABLET, DELAYED RELEASE ORAL at 08:57

## 2017-07-23 RX ADMIN — Medication SCH ML: at 08:57

## 2017-07-23 NOTE — HHI.DS
__________________________________________________





Discharge Summary


Admission Date


Jul 22, 2017 at 00:53


Discharge Date:  Jul 23, 2017


Admitting Diagnosis


GI bleed with anemia, A. fib with RVR





(1) Atrial fibrillation with RVR


ICD Code:  I48.91


(2) GI bleed


ICD Code:  K92.2


Procedures


no invasive procedures.


Brief History - From Admission


Pleasant 77-year-old female with a history of hypertension, coronary artery 

disease, hypothyroidism, GI bleeding from AVMs, who presents with a 2 day 

history of intermittent sharp sternal chest pain, accompanied with shortness of 

breath and fatigue.  Patient reports several episodes of sharp chest pain which 

last about 45 minutes, accompanied by severe fatigue and shortness of breath.  

She had an episode last night, prompting her presentation, however this has 

resolved.  The ER she was found to have atrial fibrillation with RVR.  

Spontaneously returned to sinus rhythm.  She reports currently feeling all 

right.  She also reports a large melanotic stool yesterday morning, together 

with blood on toilet paper.  She denies any bowel movements like this over the 

past several months, it is concern for recurrence of GI bleed.  She denies any 

fevers, chills, nausea, vomiting, lightheadedness, dizziness.  Denies any 

swelling in her extremities.


CBC/BMP:  


7/23/17 0919                                                                   

             7/22/17 0614





Significant Findings





Laboratory Tests








Test 7/21/17 7/22/17 7/22/17 7/22/17





 23:30 06:14 09:50 11:59


 


Red Blood Count 3.57 MIL/MM3 3.31 MIL/MM3  





 (4.00-5.30) (4.00-5.30)  


 


Hemoglobin 10.4 GM/DL 9.6 GM/DL 9.7 GM/DL 





 (11.6-15.3) (11.6-15.3) (11.6-15.3) 


 


Hematocrit 32.3 % 29.8 % 30.2 % 





 (35.0-46.0) (35.0-46.0) (35.0-46.0) 


 


Neutrophils (%) (Auto) 76.9 % 71.9 %  





 (16.0-70.0) (16.0-70.0)  


 


Monocytes (%) (Auto) 8.1 % (0.0-8.0) 9.6 % (0.0-8.0)  


 


Lymphocytes # (Auto) 0.8 TH/MM3 0.8 TH/MM3  





 (1.0-4.8) (1.0-4.8)  


 


Potassium Level 3.2 MEQ/L   





 (3.5-5.1)   


 


Estimat Glomerular Filtration 75 ML/MIN (>89)   





Rate    


 


Random Glucose 141 MG/DL   





 ()   


 


Calcium Level 8.2 MG/DL 8.2 MG/DL  





 (8.5-10.1) (8.5-10.1)  


 


Total Bilirubin LESS THAN 0.1   





 MG/DL (0.2-1.0)   


 


Alkaline Phosphatase 143 U/L 127 U/L  





 () ()  


 


B-Type Natriuretic Peptide 135 PG/ML   





 (0-100)   


 


Albumin 3.3 GM/DL 3.2 GM/DL  





 (3.4-5.0) (3.4-5.0)  


 


Troponin I  0.88 NG/ML  0.73 NG/ML





  (0.02-0.05)  (0.02-0.05)


 


    





Test 7/22/17 7/22/17 7/23/17 7/23/17





 15:25 21:20 04:10 09:19


 


Hemoglobin 9.4 GM/DL 9.2 GM/DL 9.3 GM/DL 9.5 GM/DL





 (11.6-15.3) (11.6-15.3) (11.6-15.3) (11.6-15.3)


 


Hematocrit 28.8 % 28.6 % 28.1 % 30.2 %





 (35.0-46.0) (35.0-46.0) (35.0-46.0) (35.0-46.0)








Imaging





Last Impressions








Chest X-Ray 7/21/17 6550 Signed





Impressions: 





 Service Date/Time:  Friday, July 21, 2017 23:33 - CONCLUSION: No acute disease

   





   Da Lao MD 








Hospital Course


===7/23/17====================


-Reviewed hemoglobin in the nines.  Better than baseline.  Stable


Discussed with gastroenterology.  Hold Plavix, restart in 1 week.  Follow-up 

with GI as outpatient for further studies.


===========================











//Chest pain.  Acute on admission.  Currently resolved.  Likely demand ischemia 

secondary to atrial fibrillation with RVR.


///Atrial fibrillation with RVR.  New-onset.  Resolved.  Controlled with 

diltiazem


//History of coronary artery disease.


//Troponin elevation.  0.88, decreased now to 0.73.Likely demand ischemia 

secondary to atrial fibrillation with RVR.


//Hyperlipidemia.  Chronic.  Continue home statin.


//PAD


-Holding Plavix in setting of anemia with suspected GI bleed.


-Spontaneous resolution of atrial fibrillation.  Diltiazem started for 

suppression.  Hold home amlodipine.


-Troponin elevation is likely demand ischemia from atrial fibrillation.  

Primary cardiologist consulted.  Echocardiogram reviewed.  Mitral stenosis  

Limit AC in setting of suspected GI bleed.  TSH within normal limits


-Discharge as per cardiology.  Follow-up with cardiology as outpatient.  

Patient will hold Plavix for 1 week as per gastroenterology.  Due to extensive 

atherosclerotic disease, we'll restart Plavix in 1 week as per gastroenterology.


-





//Seizure disorder.  Chronic.  Continue home medications





//Hypertension.  Hold amlodipine.  Started diltiazem for atrial fibrillation.  

Continue diltiazem.





//Chronic anemia.


//Transaminitis.


//GERD with history of Alcantara's esophagus


//Melena


-Hemoglobin in the nines.  Monitor.


-Continue home PPI.


-Gastroenterology following.


-Follow up with gastroenterology in 1 week for EGD as outpatient.





//Chronic smoking.  Cessation counseling provided.  Cessation strongly advised.





//Hypothyroidism.  Chronic.  Repeat TSH in the normal limits





//History of lung cancer.  Status post remission per patient.





//DVT prophylaxis.  SCDs Hold AC of the setting of suspected GI bleed.


Discharge Planning


Discharge today.  Hold Plavix, restart in 1 week.  Follow-up with 

gastroenterology in 1 week.  Follow-up with cardiology.


Pt Condition on Discharge:  Good


Discharge Disposition:  Discharge Home


Discharge Time:  > 30 minutes


Discharge Instructions


DIET: Follow Instructions for:  Heart Healthy Diet


Activities you can perform:  Regular-No Restrictions


Follow up Referrals:  


Cardiology - 1 Week with Mahnaz Rivera MD


Gastroenterology - 07/30/17 with Violeta Hernandez MD


PCP Follow-up - 1 Week with Shayan Lyles MD





New Medications:  


Diltiazem CD 24 HR (Cardizem CD 24 HR) 120 Mg Caper


120 MG PO DAILY heart Days 30 CAP


 


Continued Medications:  


Ascorbic Acid (Vitamin C) 1,000 Mg Tab.chew


1000 MG PO DAILY DIETARY SUPPLEMENT


Atorvastatin (Lipitor) 80 Mg Tab


80 MG PO HS Cholesterol Management #30 Ref 0 TAB


Cholecalciferol (Vitamin D-3) 1,000 Unit Cap


1000 TAB PO DAILY


Ezetimibe (Zetia) 10 Mg Tab


10 MG PO DAILY #30 Ref 0 TAB


Levothyroxine (Levothyroxine) 150 Mcg Tab


150 MCG PO DAILY Thyroid #30 Ref 0 TAB


Multiple Vitamins W/ Minerals (Multi For Her) 1 Tab Tab


1 TAB PO DAILY


Pantoprazole (Protonix) 40 Mg Tab


40 MG PO BID Ulcer Prevention Days 30 Ref 0 TAB


Phenytoin Extended (Dilantin) 100 Mg Cap


400 MG PO HS Control Seizures #180 Ref 0 CAP


 


Discontinued Medications:  


Amlodipine (Norvasc) 10 Mg Tab


10 MG PO DAILY Blood Pressure Management #30 Ref 0 TAB


Clopidogrel (Plavix) 75 Mg Tab


75 MG PO DAILY Blood Clot Prevention #30 Ref 0 TAB











Meliton Malcolm MD Jul 23, 2017 11:20

## 2017-07-23 NOTE — HHI.PR
Subjective


Remarks


pt says she feels well. no cp orsob. no BM no bleed. ate all her breakfast.





Objective





 Vital Signs








  Date Time  Temp Pulse Resp B/P Pulse Ox O2 Delivery O2 Flow Rate FiO2


 


7/23/17 08:00 97.2 68 18 127/58 97   


 


7/23/17 04:00 97.4 69 22 132/56 96   


 


7/23/17 00:07 97.3 71 20 116/57 96   


 


7/22/17 20:55 97.3 81 18 145/71 95   


 


7/22/17 20:00  82      


 


7/22/17 16:00 97.4 79 18 150/58 98   


 


7/22/17 13:46  75      


 


7/22/17 12:00 97.9 74 16 125/54 98   








 I/O








 7/22/17 7/22/17 7/22/17 7/23/17 7/23/17 7/23/17





 07:00 15:00 23:00 07:00 15:00 23:00


 


Intake Total 400 ml 1050 ml 472 ml   


 


Balance 400 ml 1050 ml 472 ml   


 


      


 


Intake Oral  650 ml    


 


IV Total 400 ml 400 ml 472 ml   


 


# Voids 3 3 2 3  


 


# Bowel Movements   0   








Result Diagram:  


7/23/17 0919                                                                   

             7/22/17 0614





Imaging





Last Impressions








Chest X-Ray 7/21/17 2328 Signed





Impressions: 





 Service Date/Time:  Friday, July 21, 2017 23:33 - CONCLUSION: No acute disease

   





   Da Lao MD 








Objective Remarks


GENERAL: Patient sitting up in chair.  Finished all her breakfast.  Appears 

comfortable.


SKIN: Warm and dry.


HEAD: Normocephalic.


EYES: No scleral icterus. No injection or drainage. 


NECK: Supple, trachea midline. No JVD.


CARDIOVASCULAR: Regular rate and rhythm without murmurs, gallops, or rubs. 


RESPIRATORY: Breath sounds equal bilaterally. No accessory muscle use.


GASTROINTESTINAL: Abdomen soft, non-tender, nondistended. 


MUSCULOSKELETAL: No cyanosis, or edema. 


BACK: Nontender without obvious deformity. No CVA tenderness.








A/P


Assessment and Plan


===7/23/17====================


-Reviewed hemoglobin in the nines.  Better than baseline.  Stable


Discussed with gastroenterology.  Hold Plavix, restart in 1 week.  Follow-up 

with GI as outpatient for further studies.


===========================











//Chest pain.  Acute on admission.  Currently resolved.  Likely demand ischemia 

secondary to atrial fibrillation with RVR.


///Atrial fibrillation with RVR.  New-onset.  Resolved.  Controlled with 

diltiazem


//History of coronary artery disease.


//Troponin elevation.  0.88, decreased now to 0.73.Likely demand ischemia 

secondary to atrial fibrillation with RVR.


//Hyperlipidemia.  Chronic.  Continue home statin.


//PAD


-Holding Plavix in setting of anemia with suspected GI bleed.


-Spontaneous resolution of atrial fibrillation.  Diltiazem started for 

suppression.  Hold home amlodipine.


-Troponin elevation is likely demand ischemia from atrial fibrillation.  

Primary cardiologist consulted.  Echocardiogram reviewed.  Mitral stenosis  

Limit AC in setting of suspected GI bleed.  TSH within normal limits


-Discharge as per cardiology.  Follow-up with cardiology as outpatient.  

Patient will hold Plavix for 1 week as per gastroenterology.  Due to extensive 

atherosclerotic disease, we'll restart Plavix in 1 week as per gastroenterology.


-





//Seizure disorder.  Chronic.  Continue home medications





//Hypertension.  Hold amlodipine.  Started diltiazem for atrial fibrillation.  

Continue diltiazem.





//Chronic anemia.


//Transaminitis.


//GERD with history of Alcantara's esophagus


//Melena


-Hemoglobin in the nines.  Monitor.


-Continue home PPI.


-Gastroenterology following.


-Follow up with gastroenterology in 1 week for EGD as outpatient.





//Chronic smoking.  Cessation counseling provided.  Cessation strongly advised.





//Hypothyroidism.  Chronic.  Repeat TSH in the normal limits





//History of lung cancer.  Status post remission per patient.





//DVT prophylaxis.  SCDs Hold AC of the setting of suspected GI bleed.


Discharge Planning


Discharge today.  Hold Plavix, restart in 1 week.  Follow-up with 

gastroenterology in 1 week.  Follow-up with cardiology.








Meliton Malcolm MD Jul 23, 2017 11:09

## 2017-07-24 NOTE — EKG
Date Performed: 07/21/2017       Time Performed: 23:18:21

 

PTAGE:      77 years

 

EKG:      Sinus rhythm 

 

 WITH OCCASIONAL VENTRICULAR PREMATURE COMPLEXES WITH OCCASIONAL SUPRAVENTRICULAR PREMATURE COMPLEXES
 MODERATE INTRAVENTRICULAR CONDUCTION DELAY BORDERLINE ECG

 

PREVIOUS TRACING       : 07/21/2017 23.05 Compared to the previous tracing, anteroalteral and inferio
r ischemic changes have resolved. The patient is now in normal sinus rhythm with PVCs.

 

DOCTOR:   Pablo Brownlee  Interpretating Date/Time  07/24/2017 10:14:30

## 2017-07-24 NOTE — EKG
Date Performed: 07/21/2017       Time Performed: 23:05:02

 

PTAGE:      77 years

 

EKG:      ATRIAL FIBRILLATION WITH RAPID VENTRICULAR RESPONSE MODERATE INTRAVENTRICULAR CONDUCTION DE
LAY NONSPECIFIC ST & T-WAVE ABNORMALITY Anterolateral ischemic changes and inferior ischemic changes 
are present. ABNORMAL ECG

 

PREVIOUS TRACING       : 11/04/2016 11.49

 

DOCTOR:   Pablo Brownlee  Interpretating Date/Time  07/24/2017 09:32:11

## 2017-07-24 NOTE — EKG
Date Performed: 07/22/2017       Time Performed: 15:41:50

 

PTAGE:      77 years

 

EKG:      Sinus rhythm 

 

, rate 75 bpm Nonspecific T-wave abnormality Borderline ECG

 

PREVIOUS TRACING       : 07/21/2017 23.18 Compared to prior tracing no significant change.

 

DOCTOR:   Pablo Brownlee  Interpretating Date/Time  07/24/2017 10:44:50

## 2017-09-28 ENCOUNTER — HOSPITAL ENCOUNTER (EMERGENCY)
Dept: HOSPITAL 17 - PHED | Age: 77
Discharge: HOME | End: 2017-09-28
Payer: MEDICARE

## 2017-09-28 VITALS
SYSTOLIC BLOOD PRESSURE: 110 MMHG | HEART RATE: 75 BPM | DIASTOLIC BLOOD PRESSURE: 49 MMHG | OXYGEN SATURATION: 99 % | RESPIRATION RATE: 18 BRPM

## 2017-09-28 VITALS
DIASTOLIC BLOOD PRESSURE: 50 MMHG | HEART RATE: 74 BPM | OXYGEN SATURATION: 99 % | RESPIRATION RATE: 18 BRPM | SYSTOLIC BLOOD PRESSURE: 96 MMHG

## 2017-09-28 VITALS
HEART RATE: 79 BPM | RESPIRATION RATE: 18 BRPM | SYSTOLIC BLOOD PRESSURE: 135 MMHG | OXYGEN SATURATION: 97 % | DIASTOLIC BLOOD PRESSURE: 59 MMHG | TEMPERATURE: 98.2 F

## 2017-09-28 VITALS — BODY MASS INDEX: 24.77 KG/M2 | WEIGHT: 145.06 LBS | HEIGHT: 64 IN

## 2017-09-28 VITALS — OXYGEN SATURATION: 96 %

## 2017-09-28 DIAGNOSIS — J44.9: ICD-10-CM

## 2017-09-28 DIAGNOSIS — R06.02: ICD-10-CM

## 2017-09-28 DIAGNOSIS — Z85.118: ICD-10-CM

## 2017-09-28 DIAGNOSIS — R10.32: Primary | ICD-10-CM

## 2017-09-28 DIAGNOSIS — I48.91: ICD-10-CM

## 2017-09-28 LAB
ALP SERPL-CCNC: 147 U/L (ref 45–117)
ALT SERPL-CCNC: 20 U/L (ref 10–53)
ANION GAP SERPL CALC-SCNC: 8 MEQ/L (ref 5–15)
AST SERPL-CCNC: 18 U/L (ref 15–37)
BILIRUB SERPL-MCNC: 0.2 MG/DL (ref 0.2–1)
BUN SERPL-MCNC: 13 MG/DL (ref 7–18)
CHLORIDE SERPL-SCNC: 102 MEQ/L (ref 98–107)
CK SERPL-CCNC: 48 U/L (ref 26–192)
EOSINOPHIL NFR BLD: 1 % (ref 0–4)
ERYTHROCYTE [DISTWIDTH] IN BLOOD BY AUTOMATED COUNT: 20.2 % (ref 11.6–17.2)
GFR SERPLBLD BASED ON 1.73 SQ M-ARVRAT: 97 ML/MIN (ref 89–?)
HCO3 BLD-SCNC: 27.1 MEQ/L (ref 21–32)
HCT VFR BLD CALC: 27.7 % (ref 35–46)
HEMO FLAGS: (no result)
MAGNESIUM SERPL-MCNC: 2.1 MG/DL (ref 1.5–2.5)
MCH RBC QN AUTO: 24.3 PG (ref 27–34)
MCHC RBC AUTO-ENTMCNC: 30.4 % (ref 32–36)
MCV RBC AUTO: 80.1 FL (ref 80–100)
NEUTS BAND # BLD MANUAL: 2.4 TH/MM3 (ref 1.8–7.7)
NEUTS BAND NFR BLD: 1 % (ref 0–6)
NEUTS SEG NFR BLD MANUAL: 73 % (ref 16–70)
PLATELET # BLD: 203 TH/MM3 (ref 150–450)
POTASSIUM SERPL-SCNC: 3.7 MEQ/L (ref 3.5–5.1)
RBC # BLD AUTO: 3.46 MIL/MM3 (ref 4–5.3)
SCAN/DIFF: (no result)
SODIUM SERPL-SCNC: 137 MEQ/L (ref 136–145)
WBC # BLD AUTO: 3.2 TH/MM3 (ref 4–11)
WBC DIFF SAMPLE: 100

## 2017-09-28 PROCEDURE — 99285 EMERGENCY DEPT VISIT HI MDM: CPT

## 2017-09-28 PROCEDURE — 96375 TX/PRO/DX INJ NEW DRUG ADDON: CPT

## 2017-09-28 PROCEDURE — 83880 ASSAY OF NATRIURETIC PEPTIDE: CPT

## 2017-09-28 PROCEDURE — 82550 ASSAY OF CK (CPK): CPT

## 2017-09-28 PROCEDURE — 85007 BL SMEAR W/DIFF WBC COUNT: CPT

## 2017-09-28 PROCEDURE — 80053 COMPREHEN METABOLIC PANEL: CPT

## 2017-09-28 PROCEDURE — 84484 ASSAY OF TROPONIN QUANT: CPT

## 2017-09-28 PROCEDURE — 85027 COMPLETE CBC AUTOMATED: CPT

## 2017-09-28 PROCEDURE — 94664 DEMO&/EVAL PT USE INHALER: CPT

## 2017-09-28 PROCEDURE — 96361 HYDRATE IV INFUSION ADD-ON: CPT

## 2017-09-28 PROCEDURE — 71010: CPT

## 2017-09-28 PROCEDURE — 83735 ASSAY OF MAGNESIUM: CPT

## 2017-09-28 PROCEDURE — 94640 AIRWAY INHALATION TREATMENT: CPT

## 2017-09-28 PROCEDURE — 93005 ELECTROCARDIOGRAM TRACING: CPT

## 2017-09-28 PROCEDURE — 74176 CT ABD & PELVIS W/O CONTRAST: CPT

## 2017-09-28 PROCEDURE — 96374 THER/PROPH/DIAG INJ IV PUSH: CPT

## 2017-09-28 RX ADMIN — IPRATROPIUM BROMIDE AND ALBUTEROL SULFATE SCH AMPULE: .5; 3 SOLUTION RESPIRATORY (INHALATION) at 12:58

## 2017-09-28 RX ADMIN — IPRATROPIUM BROMIDE AND ALBUTEROL SULFATE SCH AMPULE: .5; 3 SOLUTION RESPIRATORY (INHALATION) at 12:57

## 2017-09-28 NOTE — RADRPT
EXAM DATE/TIME:  09/28/2017 13:34 

 

[facility] COMPARISON:     

CHEST SINGLE AP, September 28, 2017, 13:15.

 

 

INDICATIONS :     

Left lower quadrant pain.

                  

 

ORAL CONTRAST:      

No oral contrast ingested.

                  

 

RADIATION DOSE:     

8.91 CTDIvol (mGy) 

 

 

MEDICAL HISTORY :     

Aneurysm, intracranial. Chronic obstructive pulmonary disease. Myocardial infarction.Seizures.  Lung 
cancer.  

 

SURGICAL HISTORY :      

Intracranial aneurysm repair. 

 

ENCOUNTER:      

Initial

 

ACUITY:      

3 weeks

 

PAIN SCALE:      

5/10

 

LOCATION:       

Left lower quadrant 

 

TECHNIQUE:     

Volumetric scanning of the abdomen and pelvis was performed.  Using automated exposure control and ad
justment of the mA and/or kV according to patient size, radiation dose was kept as low as reasonably 
achievable to obtain optimal diagnostic quality images.  DICOM format image data is available electro
nically for review and comparison.  

 

FINDINGS:     

 

LOWER LUNGS:     

Small to moderate left sided pleural effusion with associated partially imaged wedge-shaped airspace 
consolidation in the left lung base.

 

LIVER:     

Homogeneous density without lesion.  There is no dilation of the biliary tree.  No calcified gallston
es.

 

SPLEEN:     

Normal size without lesion.

 

PANCREAS:     

Within normal limits. 

 

KIDNEYS:     

3.9 x 2.9 cm cyst in the posterior superior pole of the right kidney. 3.6 x 3.3 cm cyst in the inferi
or pole of the right kidney. Small calcifications near the renal pelvis bilaterally are likely vascul
ar. No evidence for hydronephrosis.

 

ADRENAL GLANDS:     

Within normal limits.

 

VASCULAR:     

Heavily calcified abdominal aorta and proximal branch vessels bilateral common iliac artery stents in
 place. Partially imaged left femoral bypass graft.

 

BOWEL/MESENTERY:     

Mild sigmoid diverticulosis. No significant inflammatory stranding to suggest diverticulitis. Bowel a
ppears grossly unremarkable. No evidence for obstruction. No free air or drainable fluid collection. 
No significant free fluid.

 

ABDOMINAL WALL:     

Within normal limits.

 

RETROPERITONEUM:     

There is no lymphadenopathy.

 

BLADDER:     

No wall thickening or mass.

 

REPRODUCTIVE:     

Within normal limits.

 

INGUINAL:     

There is no lymphadenopathy or hernia.

 

MUSCULOSKELETAL:     

Degenerative spondylosis of the lower lumbar spine. Focal sclerotic lesion in the left acetabulum con
sistent with bone island. No abnormal lytic or blastic bony lesions.

 

CONCLUSION:     

1. Extensive arterial atherosclerotic disease with bilateral iliac artery stents and left femoral byp
ass in place. 

2. Otherwise, no definitive CT findings to explain patient's abdominal pain.

3. Sigmoid diverticulosis.

4. Ancillary findings, as above. 

 

 

 Avila Ricci MD on September 28, 2017 at 13:46           

Board Certified Radiologist.

 This report was verified electronically.

## 2017-09-28 NOTE — PD
HPI


Chief Complaint:  Abdominal Pain


Time Seen by Provider:  12:44


Travel History


International Travel<30 days:  No


Contact w/Intl Traveler<30days:  No


Traveled to known affect area:  No





History of Present Illness


HPI


The patient is a 77-year-old  female who presents to the emergency 

department via private vehicle for abdominal pain or shortness of breath.  The 

patient notes a 1 month history of left-sided abdominal and flank pain which 

she initially attributed to being a muscle after her son told her it was a 

muscle strain.  However, the pain has persisted over the last month and has 

progressed.  She denies any nausea, vomiting, or change in bowel habits.  She 

denies any history of diverticulitis and denies any associated dysuria, 

frequency, or urgency.  The patient also states she recently had an upper 

respiratory infection with fevers as high as 102 at home which resolved 2 days 

ago after taking Tylenol and ibuprofen.  She then developed a dry mostly 

nonproductive cough with mild shortness of breath.  The patient does have a 

history of lung cancer was diagnosed with COPD, however, states she was never 

treated for COPD.  She denies any chest pain.  She does complain of exertional 

shortness of breath with a dry nonproductive cough.  She does have a history of 

atrial fibrillation but denies any history of congestive heart failure, 

pulmonary embolism, or DVT.  The patient's cardiologist is Dr. Rivera.  

The patient's primary physician is Dr. Shayan Lyles.





[facility]


Past Medical History


Arthritis:  Yes


Anxiety:  Yes


Depression:  Yes


Heart Rhythm Problems:  Yes (A Fib)


Cancer:  Yes (lung with radiation )


Cardiac Catheterization:  No


Cardiovascular Problems:  Yes (AFIB)


High Cholesterol:  Yes


Chemotherapy:  No


Chest Pain:  Yes


Congestive Heart Failure:  No


COPD:  Yes


Coronary Artery Disease:  Yes


Diabetes:  No


Diminished Hearing:  Yes


Endocrine:  Yes


Gastrointestinal Disorders:  Yes


GERD:  Yes


Hepatitis:  No


Hiatal Hernia:  Yes (REFLUX)


Hypertension:  Yes


Immune Disorder:  No


Medical other:  Yes (PVD)


Musculoskeletal:  Yes


Neurologic:  Yes


Psychiatric:  Yes


Reproductive:  No


Respiratory:  Yes


Myocardial Infarction:  Yes (1987)


Radiation Therapy:  Yes


Seizures:  Yes


Thyroid Disease:  Yes (hypo)


Tetanus Vaccination:  Unknown


Influenza Vaccination:  No


Pregnant?:  Not Pregnant


Menopausal:  Yes


:  4


Para:  3


Miscarriage:  1





Past Surgical History


Abdominal Surgery:  Yes (STENTS PLACED TO STOMACH AS STATED 2001 R/T PVD)


Cardiac Surgery:  Yes (ANGIO-PLASTY)


Coronary Artery Bypass Graft:  No


Ear Surgery:  No


Endocrine Surgery:  No


Eye Surgery:  No


Genitourinary Surgery:  No


Gynecologic Surgery:  No


Joint Replacement:  No


Neurologic Surgery:  Yes (BRAIN ANEURYSM REPAIR)


Oral Surgery:  No


Pacemaker:  No


Thoracic Surgery:  No


Other Surgery:  Yes (throat, brain aneurysm )





Social History


Alcohol Use:  Yes (socially)


Tobacco Use:  No


Substance Use:  No





Allergies-Medications


(Allergen,Severity, Reaction):  


Coded Allergies:  


     No Known Allergies (Verified , 17)


Reported Meds & Prescriptions





Reported Meds & Active Scripts


Active


Cardizem CD 24 HR (Diltiazem CD 24 HR) 120 Mg Caper 120 Mg PO DAILY


Protonix (Pantoprazole Sodium) 40 Mg Tab 40 Mg PO BID 30 Days


Reported


Vitamin C (Ascorbic Acid) 1,000 Mg Tab.chew 1,000 Mg PO DAILY


Vitamin D-3 (Cholecalciferol) 1,000 Unit Cap 1,000 Tab PO DAILY


Multi For Her (Multiple Vitamins W/ Minerals) 1 Tab Tab 1 Tab PO DAILY


Dilantin (Phenytoin Extended) 100 Mg Cap 400 Mg PO HS


Lipitor (Atorvastatin Calcium) 80 Mg Tab 80 Mg PO HS


Zetia (Ezetimibe) 10 Mg Tab 10 Mg PO DAILY


Plavix (Clopidogrel Bisulfate) 75 Mg Tab 75 Mg PO DAILY


Levothyroxine (Levothyroxine Sodium) 150 Mcg Tab 150 Mcg PO DAILY








Review of Systems


Except as stated in HPI:  all other systems reviewed are Neg


General / Constitutional:  Positive: Fever


Cardiovascular:  Positive: Dyspnea on exertion, No: Chest Pain or Discomfort


Respiratory:  Positive: Cough, Shortness of Breath


Gastrointestinal:  Positive: Abdominal Pain, No: Nausea, Vomiting, Diarrhea, 

Constipation, Loss of Appetite


Genitourinary:  No: Dysuria


Musculoskeletal:  No: Edema





Physical Exam


Narrative


GENERAL: Awake, alert, pleasant 77-year-old female who appears her stated age 

and is in no acute respiratory distress.


SKIN: Focused skin assessment warm/dry.


HEAD: Atraumatic. Normocephalic. 


EYES: No injection or drainage.


ENT: No nasal bleeding or discharge.  Mucous membranes pink and moist.


NECK: Trachea midline. No JVD. 


CARDIOVASCULAR: Regular rate and rhythm.  No murmur appreciated.  Heart rate in 

the 70s.


RESPIRATORY: No accessory muscle use. Clear to auscultation. Breath sounds 

equal bilaterally. 


GASTROINTESTINAL: Abdomen soft, tender palpation left lower quadrant.  No 

guarding or rigidity.


MUSCULOSKELETAL: No obvious deformities. No clubbing.  No cyanosis.  No edema. 


NEUROLOGICAL: Awake and alert. No obvious cranial nerve deficits.  Motor 

grossly within normal limits. Normal speech.  Nonfocal.


PSYCHIATRIC: Appropriate mood and affect; insight and judgment normal.





Data


Data


Last Documented VS





Vital Signs








  Date Time  Temp Pulse Resp B/P (MAP) Pulse Ox O2 Delivery O2 Flow Rate FiO2


 


17 13:32     96 Nasal Cannula 2.00 


 


17 13:06   20     


 


17 12:19 98.2 79  135/59 (84)    








Orders





 Orders


Complete Blood Count With Diff (17 12:44)


Comprehensive Metabolic Panel (17 12:44)


B-Type Natriuretic Peptide (17 12:44)


Magnesium (Mg) (17 12:44)


Ckmb (Isoenzyme) Profile (17 12:44)


Troponin I (17 12:44)


Iv Access Insert/Monitor (17 12:44)


Electrocardiogram (17 12:44)


Ecg Monitoring (17 12:44)


Oximetry (17 12:44)


Oxygen Administration (17 12:44)


Chest, Single Ap (17 12:44)


Sodium Chloride 0.9% Flush (Ns Flush) (17 12:45)


Albuterol-Ipratropium Neb (Duoneb Neb) (17 12:45)


Ct Abd/Pel W/O Iv Contrast (17 12:44)


Morphine Inj (Morphine Inj) (17 12:45)


Ondansetron Inj (Zofran Inj) (17 12:45)


Sodium Chlor 0.9% 1000 Ml Inj (Ns 1000 M (17 12:44)


Sodium Chloride 0.9% Flush (Ns Flush) (17 12:45)





Labs





Laboratory Tests








Test


  17


12:50


 


White Blood Count 3.2 TH/MM3 


 


Red Blood Count 3.46 MIL/MM3 


 


Hemoglobin 8.4 GM/DL 


 


Hematocrit 27.7 % 


 


Mean Corpuscular Volume 80.1 FL 


 


Mean Corpuscular Hemoglobin 24.3 PG 


 


Mean Corpuscular Hemoglobin


Concent 30.4 % 


 


 


Red Cell Distribution Width 20.2 % 


 


Platelet Count 203 TH/MM3 


 


Mean Platelet Volume 6.2 FL 


 


CBC Comment AUTO DIFF 


 


Differential Total Cells


Counted 100 


 


 


Neutrophils % (Manual) 73 % 


 


Band Neutrophils % 1 % 


 


Lymphocytes % 16 % 


 


Monocytes % 9 % 


 


Eosinophils % 1 % 


 


Neutrophils # (Manual) 2.4 TH/MM3 


 


Differential Comment


  FINAL DIFF


MANUAL


 


Blood Urea Nitrogen 13 MG/DL 


 


Creatinine 0.60 MG/DL 


 


Random Glucose 94 MG/DL 


 


Total Protein 7.1 GM/DL 


 


Albumin 3.3 GM/DL 


 


Calcium Level 8.8 MG/DL 


 


Magnesium Level 2.1 MG/DL 


 


Alkaline Phosphatase 147 U/L 


 


Aspartate Amino Transf


(AST/SGOT) 18 U/L 


 


 


Alanine Aminotransferase


(ALT/SGPT) 20 U/L 


 


 


Total Bilirubin 0.2 MG/DL 


 


Sodium Level 137 MEQ/L 


 


Potassium Level 3.7 MEQ/L 


 


Chloride Level 102 MEQ/L 


 


Carbon Dioxide Level 27.1 MEQ/L 


 


Anion Gap 8 MEQ/L 


 


Estimat Glomerular Filtration


Rate 97 ML/MIN 


 


 


Total Creatine Kinase 48 U/L 


 


Troponin I 0.03 NG/ML 


 


B-Type Natriuretic Peptide 226 PG/ML 











MDM


Medical Decision Making


Medical Screen Exam Complete:  Yes


Emergency Medical Condition:  Yes


Medical Record Reviewed:  Yes


Interpretation(s)


EKG reveals normal sinus rhythm with a rate of 76.  Moderate intraventricular 

conduction delay.  Nonspecific ST-T wave changes.








Laboratory Tests








Test


  17


12:50


 


White Blood Count 3.2 TH/MM3 


 


Red Blood Count 3.46 MIL/MM3 


 


Hemoglobin 8.4 GM/DL 


 


Hematocrit 27.7 % 


 


Mean Corpuscular Volume 80.1 FL 


 


Mean Corpuscular Hemoglobin 24.3 PG 


 


Mean Corpuscular Hemoglobin


Concent 30.4 % 


 


 


Red Cell Distribution Width 20.2 % 


 


Platelet Count 203 TH/MM3 


 


Mean Platelet Volume 6.2 FL 


 


CBC Comment AUTO DIFF 


 


Differential Total Cells


Counted 100 


 


 


Neutrophils % (Manual) 73 % 


 


Band Neutrophils % 1 % 


 


Lymphocytes % 16 % 


 


Monocytes % 9 % 


 


Eosinophils % 1 % 


 


Neutrophils # (Manual) 2.4 TH/MM3 


 


Differential Comment


  FINAL DIFF


MANUAL


 


Blood Urea Nitrogen 13 MG/DL 


 


Creatinine 0.60 MG/DL 


 


Random Glucose 94 MG/DL 


 


Total Protein 7.1 GM/DL 


 


Albumin 3.3 GM/DL 


 


Calcium Level 8.8 MG/DL 


 


Magnesium Level 2.1 MG/DL 


 


Alkaline Phosphatase 147 U/L 


 


Aspartate Amino Transf


(AST/SGOT) 18 U/L 


 


 


Alanine Aminotransferase


(ALT/SGPT) 20 U/L 


 


 


Total Bilirubin 0.2 MG/DL 


 


Sodium Level 137 MEQ/L 


 


Potassium Level 3.7 MEQ/L 


 


Chloride Level 102 MEQ/L 


 


Carbon Dioxide Level 27.1 MEQ/L 


 


Anion Gap 8 MEQ/L 


 


Estimat Glomerular Filtration


Rate 97 ML/MIN 


 


 


Total Creatine Kinase 48 U/L 


 


Troponin I 0.03 NG/ML 


 


B-Type Natriuretic Peptide 226 PG/ML 








Last Impressions








Chest X-Ray 17 Signed





Impressions: 





 Service Date/Time:   13:15 - CONCLUSION:  Tenting 

of 





 left diaphragm and mild left basilar atelectasis.     Germain Conde MD 


 


Abdomen/Pelvis CT 17 Signed





Impressions: 





 Service Date/Time:   13:34 - CONCLUSION:  1. 





 Extensive arterial atherosclerotic disease with bilateral iliac artery stents 





 and left femoral bypass in place.  2. Otherwise, no definitive CT findings to 





 explain patient's abdominal pain. 3. Sigmoid diverticulosis. 4. Ancillary 





 findings, as above.     Avila Ricci MD 








Differential Diagnosis


Differential diagnosis includes COPD exacerbation, bronchitis acute coronary 

syndrome, congestive heart failure, pulmonary edema, diverticulitis, UTI, 

nephrolithiasis, pyelonephritis, muscle strain.


Narrative Course


IV was established, labs are drawn and sent, and the patient was placed on 

cardiac telemetry monitoring and continuous pulse oximetry monitoring.  EKG was 

ordered and interpreted.  Chest x-ray was obtained.  Noncontrast CT of the 

abdomen and pelvis was obtained.  The patient was administered duo nebs 2.  

The patient was also administered morphine, Zofran, and IV fluids for her 

abdominal pain.  UA was sent to lab.  Chest x-ray reveals atelectasis, 

otherwise unremarkable.  Noncontrast CT the abdomen and pelvis reveals aphthous 

chronic changes but no acute findings.  Hemoglobin is low at 8.3, patient does 

states she has a history of anemia had a colonoscopy last April, and is 

scheduled to see GI in October for endoscopy.  The patient does state she was 

recently at a 9 with her hemoglobin.  The patient does have a history of COPD 

but denies previous treatment.  Patient was given a duo neb, will be discharged 

with an albuterol inhaler.  She has no fever, no evidence of pneumonia, do not 

believe antibiotics are warranted.  BNP was minimally elevated at 226, troponin 

was 0.03.  EKG revealed nonspecific changes.  Patient will be provided a copy 

of her CT results and lab results at discharge.  She is advised to follow-up 

with her primary physician Dr. Lyles and her gastroenterologist, Dr. Hernandez.  

She is also advised to follow-up with her cardiologist, Dr. Rivera.





Diagnosis





 Primary Impression:  


 Abdominal pain


 Qualified Codes:  R10.32 - Left lower quadrant pain


 Additional Impression:  


 Dyspnea


 Qualified Codes:  R06.02 - Shortness of breath


Patient Instructions:  General Instructions





***Additional Instructions:  


Please provide the patient a copy of her labs and CT results at discharge.  

Follow-up with your primary physician, gastroenterologist, and cardiologist.  

Return if symptoms worsen or progress.


Disposition:  01 DISCHARGE HOME


Condition:  Stable











Nitin Sorenson MD Sep 28, 2017 12:53

## 2017-09-28 NOTE — RADRPT
EXAM DATE/TIME:  09/28/2017 13:15 

 

[facility] COMPARISON:     

CHEST SINGLE AP, October 27, 2016, 15:08.

 

                     

INDICATIONS :     

Short of breath and chest pain starting this morning.

                     

 

MEDICAL HISTORY :            

Hypertension. Hypothyroidism. Carcinoma, lung. Skin cancer.   

 

SURGICAL HISTORY :        

Cardiac cath

 

ENCOUNTER:     

Initial                                        

 

ACUITY:     

1 day      

 

PAIN SCORE:     

4/10

 

LOCATION:     

Left chest 

 

FINDINGS:     

Single AP view of the chest. Tenting of the left hemidiaphragm. Atelectasis at the left lung base. Pushpa
ngs otherwise clear. No evidence of pleural effusion or pneumothorax. Cardiomediastinal silhouette wi
thin normal limits.

 

CONCLUSION:     

Tenting of left diaphragm and mild left basilar atelectasis.

 

 

 

 Germain Conde MD on September 28, 2017 at 13:33           

Board Certified Radiologist.

 This report was verified electronically.

## 2017-09-29 NOTE — EKG
Date Performed: 09/28/2017       Time Performed: 12:51:26

 

PTAGE:      77 years

 

EKG:      Sinus rhythm 

 

 MODERATE INTRAVENTRICULAR CONDUCTION DELAY NONSPECIFIC ST & T-WAVE ABNORMALITY BORDERLINE ECG Compar
ed to prior tracing no significant change 

 

 PREVIOUS TRACING            : 07/22/2017 15.41

 

DOCTOR:   Dawit Lizama  Interpretating Date/Time  09/29/2017 10:11:55

## 2017-10-10 ENCOUNTER — HOSPITAL ENCOUNTER (EMERGENCY)
Dept: HOSPITAL 17 - PHED | Age: 77
Discharge: HOME | End: 2017-10-10
Payer: COMMERCIAL

## 2017-10-10 VITALS — WEIGHT: 146.61 LBS | BODY MASS INDEX: 25.03 KG/M2 | HEIGHT: 64 IN

## 2017-10-10 VITALS
SYSTOLIC BLOOD PRESSURE: 134 MMHG | RESPIRATION RATE: 16 BRPM | HEART RATE: 82 BPM | OXYGEN SATURATION: 98 % | TEMPERATURE: 98 F | DIASTOLIC BLOOD PRESSURE: 63 MMHG

## 2017-10-10 VITALS — DIASTOLIC BLOOD PRESSURE: 73 MMHG | SYSTOLIC BLOOD PRESSURE: 156 MMHG

## 2017-10-10 VITALS
RESPIRATION RATE: 16 BRPM | SYSTOLIC BLOOD PRESSURE: 149 MMHG | HEART RATE: 75 BPM | DIASTOLIC BLOOD PRESSURE: 67 MMHG | OXYGEN SATURATION: 99 %

## 2017-10-10 VITALS — OXYGEN SATURATION: 99 % | RESPIRATION RATE: 18 BRPM

## 2017-10-10 DIAGNOSIS — I25.10: ICD-10-CM

## 2017-10-10 DIAGNOSIS — I10: ICD-10-CM

## 2017-10-10 DIAGNOSIS — F41.1: ICD-10-CM

## 2017-10-10 DIAGNOSIS — R01.1: ICD-10-CM

## 2017-10-10 DIAGNOSIS — K21.9: ICD-10-CM

## 2017-10-10 DIAGNOSIS — Z79.899: ICD-10-CM

## 2017-10-10 DIAGNOSIS — E78.00: ICD-10-CM

## 2017-10-10 DIAGNOSIS — E03.9: ICD-10-CM

## 2017-10-10 DIAGNOSIS — I48.0: ICD-10-CM

## 2017-10-10 DIAGNOSIS — I25.2: ICD-10-CM

## 2017-10-10 DIAGNOSIS — J44.9: Primary | ICD-10-CM

## 2017-10-10 DIAGNOSIS — Z87.891: ICD-10-CM

## 2017-10-10 DIAGNOSIS — F32.9: ICD-10-CM

## 2017-10-10 DIAGNOSIS — H91.90: ICD-10-CM

## 2017-10-10 LAB
ALP SERPL-CCNC: 136 U/L (ref 45–117)
ALT SERPL-CCNC: 27 U/L (ref 10–53)
ANION GAP SERPL CALC-SCNC: 6 MEQ/L (ref 5–15)
APTT BLD: 25.7 SEC (ref 24.3–30.1)
AST SERPL-CCNC: 15 U/L (ref 15–37)
BILIRUB SERPL-MCNC: 0.2 MG/DL (ref 0.2–1)
BUN SERPL-MCNC: 15 MG/DL (ref 7–18)
CHLORIDE SERPL-SCNC: 101 MEQ/L (ref 98–107)
ERYTHROCYTE [DISTWIDTH] IN BLOOD BY AUTOMATED COUNT: 19.4 % (ref 11.6–17.2)
GFR SERPLBLD BASED ON 1.73 SQ M-ARVRAT: 83 ML/MIN (ref 89–?)
HCO3 BLD-SCNC: 29.4 MEQ/L (ref 21–32)
HCT VFR BLD CALC: 26.3 % (ref 35–46)
HEMO FLAGS: (no result)
INR PPP: 1 RATIO
MCH RBC QN AUTO: 24.7 PG (ref 27–34)
MCHC RBC AUTO-ENTMCNC: 31.2 % (ref 32–36)
MCV RBC AUTO: 79.2 FL (ref 80–100)
NEUTS BAND # BLD MANUAL: 3.1 TH/MM3 (ref 1.8–7.7)
NEUTS SEG NFR BLD MANUAL: 74 % (ref 16–70)
OVALOCYTES BLD QL SMEAR: (no result)
PLAT MORPH BLD: NORMAL
PLATELET # BLD: 243 TH/MM3 (ref 150–450)
PLATELET BLD QL SMEAR: NORMAL
POTASSIUM SERPL-SCNC: 3.8 MEQ/L (ref 3.5–5.1)
PROTHROMBIN TIME: 11.4 SEC (ref 9.8–11.6)
RBC # BLD AUTO: 3.32 MIL/MM3 (ref 4–5.3)
SCAN/DIFF: (no result)
SODIUM SERPL-SCNC: 136 MEQ/L (ref 136–145)
WBC # BLD AUTO: 4.2 TH/MM3 (ref 4–11)
WBC DIFF SAMPLE: 100

## 2017-10-10 PROCEDURE — 85027 COMPLETE CBC AUTOMATED: CPT

## 2017-10-10 PROCEDURE — 85007 BL SMEAR W/DIFF WBC COUNT: CPT

## 2017-10-10 PROCEDURE — 83880 ASSAY OF NATRIURETIC PEPTIDE: CPT

## 2017-10-10 PROCEDURE — 99285 EMERGENCY DEPT VISIT HI MDM: CPT

## 2017-10-10 PROCEDURE — 85730 THROMBOPLASTIN TIME PARTIAL: CPT

## 2017-10-10 PROCEDURE — 71010: CPT

## 2017-10-10 PROCEDURE — 84484 ASSAY OF TROPONIN QUANT: CPT

## 2017-10-10 PROCEDURE — 80053 COMPREHEN METABOLIC PANEL: CPT

## 2017-10-10 PROCEDURE — 93005 ELECTROCARDIOGRAM TRACING: CPT

## 2017-10-10 PROCEDURE — 85610 PROTHROMBIN TIME: CPT

## 2017-10-10 NOTE — RADRPT
EXAM DATE/TIME:  10/10/2017 17:08 

 

[facility] COMPARISON:     

CHEST SINGLE AP, September 28, 2017, 13:15.

 

                     

INDICATIONS :     

Short of breath for a few days.

                     

 

MEDICAL HISTORY :            

Aneurysm, intracranial. Chronic obstructive pulmonary disease. Myocardial infarction.Seizures. Lung c
ancer.   

 

SURGICAL HISTORY :        

Intracranial aneurysm repair.

 

ENCOUNTER:     

Initial                                        

 

ACUITY:     

2 days      

 

PAIN SCORE:     

0/10

 

LOCATION:     

Bilateral chest 

 

FINDINGS:     

The stable changes in the left lower lung with some tenting of the diaphragm. Otherwise, the lungs re
main grossly clear. No definite new infiltrates are demonstrated. No definite pleural effusions or pu
lmonary edema. The heart size is stable. The bony structures are stable.

 

CONCLUSION:     

No acute disease. Stable parenchymal disease in the left lower lung. No significant change has occurr
ed.  

 

 

 

 Bruno Raymundo MD on October 10, 2017 at 17:39           

Board Certified Radiologist.

 This report was verified electronically.

## 2017-10-10 NOTE — PD
HPI


.


Dyspnea


Chief Complaint:  Respiratory Symptoms


Time Seen by Provider:  16:07


Travel History


International Travel<30 days:  No


Contact w/Intl Traveler<30days:  No


Traveled to known affect area:  No





History of Present Illness


HPI


This patient presents with the chief complaint of dyspnea.  It has been ongoing 

for the last several weeks.  She also reports weakness.  Her symptoms are 

exacerbated by ambulation.  Symptoms are mild.  She states that she has a 

history of GI bleed with anemia and she feels that her symptoms are probably 

secondary to this.  She states that her most recent hemoglobin was 8.1.  She 

has not noted any blood in her stools recently.  In addition, the patient 

states that her atrial fibrillation has been acting up today.  She states that 

it is intermittent.  She reports that she takes Cardizem for her atrial 

fibrillation.





PFSH


Past Medical History


Arthritis:  Yes


Atrial Fibrillation:  Yes


Anxiety:  Yes


Depression:  Yes


Heart Rhythm Problems:  Yes (A Fib)


Cancer:  Yes (lung with radiation )


Cardiac Catheterization:  Yes


Cardiovascular Problems:  Yes (AFIB)


High Cholesterol:  Yes


Chemotherapy:  No


Chest Pain:  Yes


Congestive Heart Failure:  No


COPD:  Yes


Coronary Artery Disease:  Yes


Diabetes:  No


Diminished Hearing:  Yes


Endocrine:  Yes


Gastrointestinal Disorders:  Yes


GERD:  Yes


Hepatitis:  No


Hiatal Hernia:  Yes (REFLUX)


Hypertension:  Yes


Immune Disorder:  No


Musculoskeletal:  Yes


Neurologic:  Yes


Psychiatric:  Yes


Reproductive:  No


Respiratory:  Yes


Myocardial Infarction:  Yes ()


Radiation Therapy:  Yes


Seizures:  Yes


Thyroid Disease:  Yes (hypo)


Tetanus Vaccination:  > 5 Years


Influenza Vaccination:  No


Pregnant?:  Not Pregnant


Menopausal:  Yes


:  4


Para:  3


Miscarriage:  1





Past Surgical History


Abdominal Surgery:  Yes (STENTS PLACED TO STOMACH AS STATED 2001 bypass R/T PVD)


Cardiac Surgery:  Yes (ANGIO-PLASTY)


Coronary Artery Bypass Graft:  No


Ear Surgery:  No


Endocrine Surgery:  No


Eye Surgery:  No


Genitourinary Surgery:  No


Gynecologic Surgery:  No


Joint Replacement:  No


Neurologic Surgery:  Yes (BRAIN ANEURYSM REPAIR )


Oral Surgery:  No


Pacemaker:  No


Thoracic Surgery:  No


Tonsillectomy:  Yes


Other Surgery:  Yes (throat, brain aneurysm )





Family History


Family Myocardial Infarction:  Yes





Social History


Alcohol Use:  Yes (socially)


Tobacco Use:  No (quit 2 yrs ago smoked for 60yrs 3/4 ppd)


Substance Use:  No





Allergies-Medications


(Allergen,Severity, Reaction):  


Coded Allergies:  


     No Known Allergies (Verified , 10/10/17)


Reported Meds & Prescriptions





Reported Meds & Active Scripts


Active


Ventolin Hfa 18 GM Inh (Albuterol Sulfate) 90 Mcg/Act Aer 2 Puff INH Q4H PRN


Protonix (Pantoprazole Sodium) 40 Mg Tab 40 Mg PO BID 30 Days


Reported


Cardizem CD 24 HR (Diltiazem CD 24 HR) 240 Mg Caper 240 Mg PO BID


Vitamin C (Ascorbic Acid) 1,000 Mg Tab.chew 1,000 Mg PO DAILY


Vitamin D-3 (Cholecalciferol) 1,000 Unit Cap 1,000 Tab PO DAILY


Multi For Her (Multiple Vitamins W/ Minerals) 1 Tab Tab 1 Tab PO DAILY


Dilantin (Phenytoin Extended) 100 Mg Cap 400 Mg PO HS


Lipitor (Atorvastatin Calcium) 80 Mg Tab 80 Mg PO HS


Zetia (Ezetimibe) 10 Mg Tab 10 Mg PO DAILY


Plavix (Clopidogrel Bisulfate) 75 Mg Tab 75 Mg PO DAILY


Levothyroxine (Levothyroxine Sodium) 150 Mcg Tab 150 Mcg PO DAILY








Review of Systems


Except as stated in HPI:  all other systems reviewed are Neg


General / Constitutional:  No: Fever, Chills


Cardiovascular:  Positive: Palpitations, Irregular Rhythm, No: Chest Pain or 

Discomfort


Respiratory:  Positive: Shortness of Breath


Gastrointestinal:  No: Nausea, Vomiting, Hematemesis, Hematochezia





Physical Exam


Narrative


GENERAL: Awake and alert and in no acute distress.


SKIN:  warm/dry.


HEAD: Normocephalic.  Atraumatic.


EYES: Pupils equal and round. No scleral icterus. No injection or drainage. 


ENT: No nasal bleeding or discharge.  Mucous membranes pink and moist.


NECK: Trachea midline.  Full range of motion without pain.. 


CARDIOVASCULAR: Regular rate and rhythm.  She was in a normal sinus rhythm 

during the examination.  She has a systolic ejection murmur.


RESPIRATORY: No accessory muscle use. Clear to auscultation. Breath sounds 

diminished on the left.


GASTROINTESTINAL: Abdomen soft.  Nontender.  Bowel sounds present.  

Nondistended.


MUSCULOSKELETAL: No obvious deformities. 


NEUROLOGICAL: Awake and alert. No obvious cranial nerve deficits.  Motor 

grossly within normal limits. Normal speech.


PSYCHIATRIC: Appropriate mood and affect; insight and judgment normal.





Data


Data


Last Documented VS





Vital Signs








  Date Time  Temp Pulse Resp B/P (MAP) Pulse Ox O2 Delivery O2 Flow Rate FiO2


 


10/10/17 17:32   18  98 Nasal Cannula 2.00 


 


10/10/17 17:30  75  149/67 (94)    


 


10/10/17 15:28 98.0       








Orders





 Orders


Complete Blood Count With Diff (10/10/17 16:11)


Comprehensive Metabolic Panel (10/10/17 16:11)


B-Type Natriuretic Peptide (10/10/17 16:11)


Act Partial Throm Time (Ptt) (10/10/17 16:11)


Prothrombin Time / Inr (Pt) (10/10/17 16:11)


Troponin I (10/10/17 16:11)


Iv Access Insert/Monitor (10/10/17 16:11)


Electrocardiogram (10/10/17 16:11)


Ecg Monitoring (10/10/17 16:11)


Oximetry (10/10/17 16:11)


Oxygen Administration (10/10/17 16:11)


Chest, Single Ap (10/10/17 16:11)


Sodium Chloride 0.9% Flush (Ns Flush) (10/10/17 16:15)





Labs





Laboratory Tests








Test


  10/10/17


16:20


 


White Blood Count 4.2 TH/MM3 


 


Red Blood Count 3.32 MIL/MM3 


 


Hemoglobin 8.2 GM/DL 


 


Hematocrit 26.3 % 


 


Mean Corpuscular Volume 79.2 FL 


 


Mean Corpuscular Hemoglobin 24.7 PG 


 


Mean Corpuscular Hemoglobin


Concent 31.2 % 


 


 


Red Cell Distribution Width 19.4 % 


 


Platelet Count 243 TH/MM3 


 


Mean Platelet Volume 7.1 FL 


 


CBC Comment AUTO DIFF 


 


Differential Total Cells


Counted 100 


 


 


Neutrophils % (Manual) 74 % 


 


Lymphocytes % 21 % 


 


Monocytes % 5 % 


 


Neutrophils # (Manual) 3.1 TH/MM3 


 


Differential Comment


  FINAL DIFF


MANUAL


 


Platelet Estimate NORMAL 


 


Platelet Morphology Comment NORMAL 


 


Ovalocytes 1+ 


 


Prothrombin Time 11.4 SEC 


 


Prothromb Time International


Ratio 1.0 RATIO 


 


 


Activated Partial


Thromboplast Time 25.7 SEC 


 


 


Blood Urea Nitrogen 15 MG/DL 


 


Creatinine 0.69 MG/DL 


 


Random Glucose 115 MG/DL 


 


Total Protein 7.3 GM/DL 


 


Albumin 3.2 GM/DL 


 


Calcium Level 8.6 MG/DL 


 


Alkaline Phosphatase 136 U/L 


 


Aspartate Amino Transf


(AST/SGOT) 15 U/L 


 


 


Alanine Aminotransferase


(ALT/SGPT) 27 U/L 


 


 


Total Bilirubin 0.2 MG/DL 


 


Sodium Level 136 MEQ/L 


 


Potassium Level 3.8 MEQ/L 


 


Chloride Level 101 MEQ/L 


 


Carbon Dioxide Level 29.4 MEQ/L 


 


Anion Gap 6 MEQ/L 


 


Estimat Glomerular Filtration


Rate 83 ML/MIN 


 


 


Troponin I


  LESS THAN 0.02


NG/ML


 


B-Type Natriuretic Peptide 246 PG/ML 











MDM


Medical Decision Making


Medical Screen Exam Complete:  Yes


Emergency Medical Condition:  Yes


Medical Record Reviewed:  Yes (medical history includes lung cancer, GI bleed, 

anemia, CAD, AF)


Interpretation(s)


Sinus rhythm with no acute ischemic change.


Differential Diagnosis


Differential diagnosis of dyspnea includes but is not limited to congestive 

heart failure, pneumonia, wheezing, pneumothorax, pulmonary embolism


Narrative Course


Patient presents with a chief complaint of dyspnea/weakness and the secondary 

complaint of intermittent atrial fibrillation.





CBC & BMP Diagram


10/10/17 16:20








Total Protein 7.3, Albumin 3.2 L, Calcium Level 8.6, Alkaline Phosphatase 136 H

, Aspartate Amino Transf (AST/SGOT) 15, Alanine Aminotransferase (ALT/SGPT) 27, 

Total Bilirubin 0.2








Last Impressions








Chest X-Ray 10/10/17 1611 Signed





Impressions: 





 Service Date/Time:  Tuesday, October 10, 2017 17:08 - CONCLUSION:  No acute 





 disease. Stable parenchymal disease in the left lower lung. No significant 





 change has occurred.       Bruno Raymundo MD 








Chest x-ray was independently viewed by me.





I suspect that the etiology of this patient's dyspnea and weakness is anemia.  

However, she does not meet criteria for transfusion.  I will instruct her to 

follow up closely with her primary care physician.





Diagnosis





 Primary Impression:  


 Dyspnea


 Qualified Codes:  R06.09 - Other forms of dyspnea


 Additional Impression:  


 Paroxysmal atrial fibrillation


Patient Instructions:  Dyspnea (DC), General Instructions





***Additional Instructions:  


Please follow up closely with your primary care provider.


Disposition:  01 DISCHARGE HOME


Condition:  Stable











Valeri Estrada MD Oct 10, 2017 16:45

## 2017-10-11 NOTE — EKG
Date Performed: 10/10/2017       Time Performed: 16:27:32

 

PTAGE:      77 years

 

EKG:      Sinus rhythm 

 

 NON-SPECIFIC ST/T WAVE CHANGES 

 

 PREVIOUS TRACING            : 09/28/2017 12.51 Compared to prior tracing no significant change

 

DOCTOR:   Chance Best  Interpretating Date/Time  10/11/2017 23:51:16

## 2018-03-20 ENCOUNTER — HOSPITAL ENCOUNTER (INPATIENT)
Dept: HOSPITAL 17 - NED | Age: 78
LOS: 2 days | Discharge: HOME | DRG: 181 | End: 2018-03-22
Attending: HOSPITALIST | Admitting: HOSPITALIST
Payer: COMMERCIAL

## 2018-03-20 VITALS
HEART RATE: 90 BPM | DIASTOLIC BLOOD PRESSURE: 87 MMHG | SYSTOLIC BLOOD PRESSURE: 120 MMHG | RESPIRATION RATE: 18 BRPM | OXYGEN SATURATION: 98 %

## 2018-03-20 VITALS
DIASTOLIC BLOOD PRESSURE: 60 MMHG | RESPIRATION RATE: 24 BRPM | SYSTOLIC BLOOD PRESSURE: 133 MMHG | OXYGEN SATURATION: 100 % | HEART RATE: 77 BPM

## 2018-03-20 VITALS
DIASTOLIC BLOOD PRESSURE: 53 MMHG | OXYGEN SATURATION: 98 % | TEMPERATURE: 98.3 F | SYSTOLIC BLOOD PRESSURE: 113 MMHG | RESPIRATION RATE: 24 BRPM | HEART RATE: 80 BPM

## 2018-03-20 DIAGNOSIS — R01.1: ICD-10-CM

## 2018-03-20 DIAGNOSIS — J90: ICD-10-CM

## 2018-03-20 DIAGNOSIS — I48.91: ICD-10-CM

## 2018-03-20 DIAGNOSIS — J44.9: ICD-10-CM

## 2018-03-20 DIAGNOSIS — I25.2: ICD-10-CM

## 2018-03-20 DIAGNOSIS — E78.00: ICD-10-CM

## 2018-03-20 DIAGNOSIS — Z86.718: ICD-10-CM

## 2018-03-20 DIAGNOSIS — Z92.3: ICD-10-CM

## 2018-03-20 DIAGNOSIS — I25.10: ICD-10-CM

## 2018-03-20 DIAGNOSIS — C34.92: Primary | ICD-10-CM

## 2018-03-20 DIAGNOSIS — M19.90: ICD-10-CM

## 2018-03-20 DIAGNOSIS — E03.9: ICD-10-CM

## 2018-03-20 DIAGNOSIS — D63.0: ICD-10-CM

## 2018-03-20 DIAGNOSIS — E11.9: ICD-10-CM

## 2018-03-20 DIAGNOSIS — Z87.891: ICD-10-CM

## 2018-03-20 DIAGNOSIS — R00.0: ICD-10-CM

## 2018-03-20 DIAGNOSIS — I10: ICD-10-CM

## 2018-03-20 DIAGNOSIS — R53.1: ICD-10-CM

## 2018-03-20 DIAGNOSIS — D72.819: ICD-10-CM

## 2018-03-20 DIAGNOSIS — K21.9: ICD-10-CM

## 2018-03-20 DIAGNOSIS — Z80.1: ICD-10-CM

## 2018-03-20 DIAGNOSIS — R09.02: ICD-10-CM

## 2018-03-20 LAB
ALBUMIN SERPL-MCNC: 3.1 GM/DL (ref 3.4–5)
ALP SERPL-CCNC: 163 U/L (ref 45–117)
ALT SERPL-CCNC: 23 U/L (ref 10–53)
AST SERPL-CCNC: 22 U/L (ref 15–37)
BASOPHILS # BLD AUTO: 0 TH/MM3 (ref 0–0.2)
BASOPHILS NFR BLD: 1.2 % (ref 0–2)
BILIRUB SERPL-MCNC: 0.1 MG/DL (ref 0.2–1)
BUN SERPL-MCNC: 16 MG/DL (ref 7–18)
CALCIUM SERPL-MCNC: 9 MG/DL (ref 8.5–10.1)
CHLORIDE SERPL-SCNC: 102 MEQ/L (ref 98–107)
CREAT SERPL-MCNC: 0.72 MG/DL (ref 0.5–1)
EOSINOPHIL # BLD: 0 TH/MM3 (ref 0–0.4)
EOSINOPHIL NFR BLD: 0.9 % (ref 0–4)
ERYTHROCYTE [DISTWIDTH] IN BLOOD BY AUTOMATED COUNT: 24.5 % (ref 11.6–17.2)
GFR SERPLBLD BASED ON 1.73 SQ M-ARVRAT: 79 ML/MIN (ref 89–?)
GLUCOSE SERPL-MCNC: 88 MG/DL (ref 74–106)
HCO3 BLD-SCNC: 27.6 MEQ/L (ref 21–32)
HCT VFR BLD CALC: 24.3 % (ref 35–46)
HGB BLD-MCNC: 7.5 GM/DL (ref 11.6–15.3)
INR PPP: 1.1 RATIO
LYMPHOCYTES # BLD AUTO: 0.5 TH/MM3 (ref 1–4.8)
LYMPHOCYTES NFR BLD AUTO: 13.5 % (ref 9–44)
MCH RBC QN AUTO: 25.7 PG (ref 27–34)
MCHC RBC AUTO-ENTMCNC: 30.8 % (ref 32–36)
MCV RBC AUTO: 83.6 FL (ref 80–100)
MONOCYTE #: 0.4 TH/MM3 (ref 0–0.9)
MONOCYTES NFR BLD: 11.3 % (ref 0–8)
NEUTROPHILS # BLD AUTO: 2.8 TH/MM3 (ref 1.8–7.7)
NEUTROPHILS NFR BLD AUTO: 73.1 % (ref 16–70)
OVALOCYTES BLD QL SMEAR: (no result)
PLATELET # BLD: 198 TH/MM3 (ref 150–450)
PMV BLD AUTO: 6.9 FL (ref 7–11)
PROT SERPL-MCNC: 7.3 GM/DL (ref 6.4–8.2)
PROTHROMBIN TIME: 10.8 SEC (ref 9.8–11.6)
RBC # BLD AUTO: 2.9 MIL/MM3 (ref 4–5.3)
SCHISTOCYTES BLD QL SMEAR: (no result)
SODIUM SERPL-SCNC: 138 MEQ/L (ref 136–145)
TROPONIN I SERPL-MCNC: (no result) NG/ML (ref 0.02–0.05)
WBC # BLD AUTO: 3.9 TH/MM3 (ref 4–11)

## 2018-03-20 PROCEDURE — 76604 US EXAM CHEST: CPT

## 2018-03-20 PROCEDURE — 86901 BLOOD TYPING SEROLOGIC RH(D): CPT

## 2018-03-20 PROCEDURE — 84484 ASSAY OF TROPONIN QUANT: CPT

## 2018-03-20 PROCEDURE — 83735 ASSAY OF MAGNESIUM: CPT

## 2018-03-20 PROCEDURE — 88112 CYTOPATH CELL ENHANCE TECH: CPT

## 2018-03-20 PROCEDURE — 82550 ASSAY OF CK (CPK): CPT

## 2018-03-20 PROCEDURE — 85610 PROTHROMBIN TIME: CPT

## 2018-03-20 PROCEDURE — 80053 COMPREHEN METABOLIC PANEL: CPT

## 2018-03-20 PROCEDURE — 80048 BASIC METABOLIC PNL TOTAL CA: CPT

## 2018-03-20 PROCEDURE — 71046 X-RAY EXAM CHEST 2 VIEWS: CPT

## 2018-03-20 PROCEDURE — 86920 COMPATIBILITY TEST SPIN: CPT

## 2018-03-20 PROCEDURE — 32555 ASPIRATE PLEURA W/ IMAGING: CPT

## 2018-03-20 PROCEDURE — 71045 X-RAY EXAM CHEST 1 VIEW: CPT

## 2018-03-20 PROCEDURE — 85730 THROMBOPLASTIN TIME PARTIAL: CPT

## 2018-03-20 PROCEDURE — 86900 BLOOD TYPING SEROLOGIC ABO: CPT

## 2018-03-20 PROCEDURE — 96374 THER/PROPH/DIAG INJ IV PUSH: CPT

## 2018-03-20 PROCEDURE — 88305 TISSUE EXAM BY PATHOLOGIST: CPT

## 2018-03-20 PROCEDURE — 86850 RBC ANTIBODY SCREEN: CPT

## 2018-03-20 PROCEDURE — P9016 RBC LEUKOCYTES REDUCED: HCPCS

## 2018-03-20 PROCEDURE — 85025 COMPLETE CBC W/AUTO DIFF WBC: CPT

## 2018-03-20 PROCEDURE — 93005 ELECTROCARDIOGRAM TRACING: CPT

## 2018-03-20 PROCEDURE — 36430 TRANSFUSION BLD/BLD COMPNT: CPT

## 2018-03-20 PROCEDURE — C1729 CATH, DRAINAGE: HCPCS

## 2018-03-20 RX ADMIN — Medication SCH ML: at 21:00

## 2018-03-20 NOTE — RADRPT
EXAM DATE/TIME:  03/20/2018 18:54 

 

[facility] COMPARISON:     

No previous studies available for comparison.

 

EXTERNAL COMPARISON :    

[facility], CT CHEST W/O CONTRAST, November 13, 2017

 

 

INDICATIONS :     

Shortness of breath.

                     

 

MEDICAL HISTORY :           

Hypercholesterolemia. Hypertension. Thyroid disease. Cardiac disorder. Heart attack. A Fib. COPD. Hia
drea hernia. Lung cancer. DVT.

 

SURGICAL HISTORY :          

Tonsillectomy. Brain aneurysm repair. Angio plasty. Cardiac cath. Stomach stents/bypass.

 

ENCOUNTER:     

Subsequent

 

ACUITY:     

2 months

 

PAIN SCORE:     

3/10

 

LOCATION:     

Left chest 

MEASUREMENTS:     

 

SKIN TO PARIETAL PLEURA:     

1.6 cm

 

SKIN TO MAX SAFE DEPTH:     

5.5 cm

 

ESTIMATED FLUID VOLUME:     

1780 cc

 

FLUID COMPOSITION:     

simple

                              

 

FINDINGS:     

Pleural effusion as above. A rafa was placed on the skin surface superficial to the pleural fluid col
lection.

 

 

CONCLUSION:     

1. Marked placed over large left effusion with patient sitting up the side of the bed. Estimated flui
d volume 1780 mL.

 

 

 

 Daren Hanks MD on March 20, 2018 at 20:05           

Board Certified Radiologist.

 This report was verified electronically.

## 2018-03-20 NOTE — RADRPT
EXAM DATE/TIME:  03/20/2018 14:05 

 

[facility] COMPARISON:     

CHEST SINGLE AP, January 26, 2018, 6:05.  CHEST PA & LAT, July 21, 2017, 23:33.

 

                     

INDICATIONS :     

Shortness of breath for 3 weeks.  Patient having radiation for lung cancer.

                     

 

MEDICAL HISTORY :            

Hypercholesterolemia. Hypertension. Thyroid disease. Cardiac disorder. Heart attack. A Fib. COPD. Hia
drea hernia. Lung cancer. DVT.   

 

SURGICAL HISTORY :        

Tonsillectomy. Brain aneurysm repair. Angio plasty. Cardiac cath. Stomach stents/ bypass.

 

ENCOUNTER:     

Initial                                        

 

ACUITY:     

3 weeks      

 

PAIN SCORE:     

0/10

 

LOCATION:     

Bilateral chest 

 

FINDINGS:     

PA and lateral views of the chest demonstrates persistent parenchymal consolidation and effusion the 
left lung base. This is slightly improved compared to the prior study. The right lung appears to be g
rossly clear. There is increased interstitial markings bilaterally. The heart size is enlarged but st
able. There is no evidence of pneumothorax. The bony structures are grossly intact..

 

CONCLUSION:     

Persistent parenchymal consolidation and effusion the left lung base.

Pulmonary venous congestion. 

 

 

 Bruno Raymundo MD on March 20, 2018 at 14:18           

Board Certified Radiologist.

 This report was verified electronically.

## 2018-03-20 NOTE — PD
HPI


Chief Complaint:  Respiratory Symptoms


Time Seen by Provider:  18:04


Travel History


International Travel<30 days:  No


Contact w/Intl Traveler<30days:  No


Traveled to known affect area:  No





History of Present Illness


HPI


78-year-old female that presents to the ED for evaluation of shortness of 

breath.  She has a history of pleural effusions in the past of unknown reason.  

Patient actually was seen here by me couple months ago for same and had to have 

2 thoracocentesis.  Per patient she's been feeling very short of breath and she 

had an x-ray done by her pulmonologist yesterday told her that she had fluid in 

her lungs.  She was then scheduled to get old patient thoracocentesis blood per 

patient the symptoms have worsened to the point that any movement makes it 

worse.  With oxygen in the room patient desats in the 80s.  With oxygen she 

sats in the 95.  She states that even laying down causes too much discomfort 

for her.  She denies any chest pain.  Shortness of breath is her main 

complaint.  She states that she has had no other issues.  She also has a 

history of anemia for which is getting iron infusions.  She has a history of 

heart disease.  Per patient she has a history of tumors to her chest 

bilaterally and has had radiation for them.  No chemotherapy.  Patient follows 

with Dr. Crawford for oncology as well as Dr. tavares for radiology oncology.  

Denies any urinary or bowel movement issues at this time.  No allergies to 

medication.  No pain at this time.





PFSH


Past Medical History


Arthritis:  Yes


Atrial Fibrillation:  Yes


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


Anxiety:  Yes


Depression:  Yes


Heart Rhythm Problems:  Yes (A Fib w/RVR)


Cancer:  Yes (Lung)


Cardiac Catheterization:  Yes


Cardiovascular Problems:  Yes


High Cholesterol:  Yes


Chemotherapy:  Yes


Chest Pain:  Yes


Congestive Heart Failure:  No


COPD:  Yes


Coronary Artery Disease:  Yes


Diabetes:  No


Diminished Hearing:  Yes


Endocrine:  Yes


Gastrointestinal Disorders:  Yes (AVM STOMACH)


GERD:  Yes


Hepatitis:  No


Hiatal Hernia:  Yes (REFLUX)


Heparin Induced Thrombocytopen:  No


Hypertension:  Yes


Immune Disorder:  No


Implanted Vascular Access Dvce:  No


Musculoskeletal:  Yes


Neurologic:  Yes (brain aneurysm repair)


Psychiatric:  Yes


Reproductive:  No


Respiratory:  Yes


Myocardial Infarction:  Yes ()


Radiation Therapy:  Yes


Seizures:  Yes


Thyroid Disease:  Yes (hypo)


Pregnant?:  Not Pregnant


Menopausal:  Yes


:  4


Para:  3


Miscarriage:  1





Past Surgical History


Abdominal Surgery:  Yes (STENTS PLACED TO STOMACH AS STATED 2001 bypass R/T PVD)


[employer]:  stents in abdominal aorta x 2


Cardiac Surgery:  Yes (ANGIO-PLASTY)


Coronary Artery Bypass Graft:  No


Ear Surgery:  No


Endocrine Surgery:  No


Eye Surgery:  No


Genitourinary Surgery:  No


Gynecologic Surgery:  No


Joint Replacement:  No


Neurologic Surgery:  Yes (BRAIN ANEURYSM REPAIR )


Oral Surgery:  No


Pacemaker:  No


Thoracic Surgery:  No


Tonsillectomy:  Yes


Other Surgery:  Yes (throat, brain aneurysm )





Family History


Family Myocardial Infarction:  Yes





Social History


Alcohol Use:  No


Tobacco Use:  No


Substance Use:  No





Allergies-Medications


(Allergen,Severity, Reaction):  


Coded Allergies:  


     No Known Allergies (Verified  Allergy, Unknown, 10/30/17)


Reported Meds & Prescriptions





Reported Meds & Active Scripts


Active


Digoxin 0.125 Mg Tab 0.125 Mg PO DAILY


Aspirin Low Strength (Aspirin) 81 Mg Chew 81 Mg CHEW DAILY


Protonix (Pantoprazole Sodium) 40 Mg Tab 40 Mg PO BID 30 Days


Reported


Amiodarone (Amiodarone HCl) 200 Mg Tab 200 Mg PO DAILY


Cardizem LA (Diltiazem ER 24 HR) 240 Mg Analisa 240 Mg PO HS


Lopressor (Metoprolol Tartrate) 50 Mg Tab 25 Mg PO BID


Vitamin D-3 (Cholecalciferol) 1,000 Unit Cap 1,000 Tab PO DAILY


Multi For Her (Multiple Vitamins W/ Minerals) 1 Tab Tab 1 Tab PO DAILY


Dilantin (Phenytoin Extended) 100 Mg Cap 400 Mg PO HS


Lipitor (Atorvastatin Calcium) 80 Mg Tab 80 Mg PO HS


Zetia (Ezetimibe) 10 Mg Tab 10 Mg PO DAILY


Levothyroxine (Levothyroxine Sodium) 150 Mcg Tab 150 Mcg PO DAILY








Review of Systems


Except as stated in HPI:  all other systems reviewed are Neg





Physical Exam


Narrative


GENERAL: 


SKIN: Warm and dry.


HEAD: Atraumatic. Normocephalic. 


EYES: Pupils equal and round. No scleral icterus. No injection or drainage. 


ENT: No nasal bleeding or discharge.  Mucous membranes pink and moist.  Tongue 

is midline.  No uvula deviation.


NECK: Trachea midline. No JVD. 


CARDIOVASCULAR: Regular rate and rhythm.  No murmurs, S3, S4.


RESPIRATORY: No accessory muscle use.  Diminished breath sounds especially on 

the left lower quadrant. Breath sounds equal bilaterally. 


GASTROINTESTINAL: Abdomen soft, non-tender, nondistended. Hepatic and splenic 

margins not palpable. 


MUSCULOSKELETAL: Extremities without clubbing, cyanosis, or edema. No obvious 

deformities.  Full range of motion of the upper and lower extremity is 

bilaterally.  2+ pulses bilaterally.


NEUROLOGICAL: Awake and alert. No obvious cranial nerve deficits.  Motor 

grossly within normal limits. Five out of 5 muscle strength in the arms and 

legs.  Normal speech.


PSYCHIATRIC: Appropriate mood and affect; insight and judgment normal.





Data


Data


Last Documented VS





Vital Signs








  Date Time  Temp Pulse Resp B/P (MAP) Pulse Ox O2 Delivery O2 Flow Rate FiO2


 


3/20/18 18:17  74 22  97 Nasal Cannula 2.00 


 


3/20/18 18:15    133/60 (84)    


 


3/20/18 13:40 98.3       








Orders





 Orders


Complete Blood Count With Diff (3/20/18 13:42)


Comprehensive Metabolic Panel (3/20/18 13:42)


Act Partial Throm Time (Ptt) (3/20/18 13:42)


Prothrombin Time / Inr (Pt) (3/20/18 13:42)


Ckmb (Isoenzyme) Profile (3/20/18 13:42)


Troponin I (3/20/18 13:42)


Electrocardiogram (3/20/18 13:42)


Chest, Pa & Lat (3/20/18 13:42)


Us Chest (3/20/18 )


Type And Screen (3/20/18 18:15)


Red Blood Cells (Rbc) (3/20/18 18:15)


Blood Product Administration (3/20/18 18:15)


Sodium Chlor 0.9% 250 Ml Inj (Ns 250 Ml (3/20/18 18:15)


Furosemide Inj (Lasix Inj) (3/20/18 18:30)


Admit Order (Ed Use Only) (3/20/18 19:37)


Admit To Inpatient (3/20/18 )


Vital Signs (Adult) Q4H (3/20/18 19:37)


Activity Oob With Assistance (3/20/18 19:37)


Cardiac Monitor / Telemetry .CONTINUOUS (3/20/18 19:37)


Intake + Output WM.QSHIFT (3/20/18 19:37)


Diet Heart Healthy (3/21/18 Breakfast)


Sodium Chloride 0.9% Flush (Ns Flush) (3/20/18 19:45)


Sodium Chloride 0.9% Flush (Ns Flush) (3/20/18 21:00)


Basic Metabolic Panel (Bmp) (3/21/18 06:00)


Complete Blood Count With Diff (3/21/18 06:00)


Resp Oxygen Shailesh C Titrat 1-4 L (3/20/18 )


Pt Request For Service (3/20/18 19:37)


Case Management Consult (3/20/18 19:37)


Naloxone Inj (Narcan Inj) (3/20/18 19:45)


Inpatient Certification (3/20/18 )





Labs





Laboratory Tests








Test


  3/20/18


15:37


 


White Blood Count 3.9 TH/MM3 


 


Red Blood Count 2.90 MIL/MM3 


 


Hemoglobin 7.5 GM/DL 


 


Hematocrit 24.3 % 


 


Mean Corpuscular Volume 83.6 FL 


 


Mean Corpuscular Hemoglobin 25.7 PG 


 


Mean Corpuscular Hemoglobin


Concent 30.8 % 


 


 


Red Cell Distribution Width 24.5 % 


 


Platelet Count 198 TH/MM3 


 


Mean Platelet Volume 6.9 FL 


 


Neutrophils (%) (Auto) 73.1 % 


 


Lymphocytes (%) (Auto) 13.5 % 


 


Monocytes (%) (Auto) 11.3 % 


 


Eosinophils (%) (Auto) 0.9 % 


 


Basophils (%) (Auto) 1.2 % 


 


Neutrophils # (Auto) 2.8 TH/MM3 


 


Lymphocytes # (Auto) 0.5 TH/MM3 


 


Monocytes # (Auto) 0.4 TH/MM3 


 


Eosinophils # (Auto) 0.0 TH/MM3 


 


Basophils # (Auto) 0.0 TH/MM3 


 


CBC Comment AUTO DIFF 


 


Differential Comment


  AUTO DIFF


CONFIRMED


 


Platelet Estimate NORMAL 


 


Platelet Morphology Comment NORMAL 


 


Ovalocytes 1+ 


 


Keratocytes OCC 


 


Prothrombin Time 10.8 SEC 


 


Prothromb Time International


Ratio 1.1 RATIO 


 


 


Activated Partial


Thromboplast Time 22.5 SEC 


 


 


Blood Urea Nitrogen 16 MG/DL 


 


Creatinine 0.72 MG/DL 


 


Random Glucose 88 MG/DL 


 


Total Protein 7.3 GM/DL 


 


Albumin 3.1 GM/DL 


 


Calcium Level 9.0 MG/DL 


 


Alkaline Phosphatase 163 U/L 


 


Aspartate Amino Transf


(AST/SGOT) 22 U/L 


 


 


Alanine Aminotransferase


(ALT/SGPT) 23 U/L 


 


 


Total Bilirubin 0.1 MG/DL 


 


Sodium Level 138 MEQ/L 


 


Potassium Level 4.4 MEQ/L 


 


Chloride Level 102 MEQ/L 


 


Carbon Dioxide Level 27.6 MEQ/L 


 


Anion Gap 8 MEQ/L 


 


Estimat Glomerular Filtration


Rate 79 ML/MIN 


 


 


Total Creatine Kinase 37 U/L 


 


Troponin I


  LESS THAN 0.02


NG/ML











MDM


Medical Decision Making


Medical Screen Exam Complete:  Yes


Emergency Medical Condition:  Yes


Medical Record Reviewed:  Yes


Interpretation(s)


CBC & BMP Diagram


3/20/18 15:37








Total Protein 7.3, Albumin 3.1 L, Calcium Level 9.0, Alkaline Phosphatase 163 H

, Aspartate Amino Transf (AST/SGOT) 22, Alanine Aminotransferase (ALT/SGPT) 23, 

Total Bilirubin 0.1 L








Last Impressions








Chest X-Ray 3/20/18 1342 Signed





Impressions: 





 Service Date/Time:  2018 14:05 - CONCLUSION:  Persistent 





 parenchymal consolidation and effusion the left lung base. Pulmonary venous 





 congestion.     Bruno Raymundo MD 








Differential Diagnosis


Dyspnea versus pleural effusion versus cancer versus mass versus hypoxia versus 

anemia


Narrative Course


78-year-old female that presents to the ED for evaluation of shortness of 

breath.  Patient was properly examined and was found to have signs and symptoms 

consistent appears to be a pleural effusion.  Patient had blood work and 

imaging done in 3 is.  Labs and imaging did show what appears to be pleural 

effusion.  Patient does have a lower level second.  Case was discussed in my 

attending Dr. Thomason who recommends admission for thoracocentesis and further 

eval.  LUDMILA ricardo.  Dr. Blanco agrees to admission.  Dr. Thomason recommended that 

we start patient on red blood cells but doctor on prefers to wait as patient 

will require thoracocentesis and giving patient blood could increase the fluid 

on her lungs.  At this time we'll stop the blood transfusion chest not been 

given yet.  Patient agrees with plan.  Patient was admitted to Dr. Locke





Diagnosis





 Primary Impression:  


 Pleural effusion


 Additional Impressions:  


 Mass of left lung


 Anemia


 Qualified Codes:  D64.9 - Anemia, unspecified


 Hypoxia





Admitting Information


Admitting Physician Requests:  Admit











Dom Arroyo Mar 20, 2018 18:44

## 2018-03-21 VITALS
OXYGEN SATURATION: 95 % | RESPIRATION RATE: 22 BRPM | DIASTOLIC BLOOD PRESSURE: 53 MMHG | HEART RATE: 66 BPM | SYSTOLIC BLOOD PRESSURE: 121 MMHG | TEMPERATURE: 98.6 F

## 2018-03-21 VITALS
HEART RATE: 68 BPM | TEMPERATURE: 97.8 F | SYSTOLIC BLOOD PRESSURE: 105 MMHG | OXYGEN SATURATION: 96 % | RESPIRATION RATE: 17 BRPM | DIASTOLIC BLOOD PRESSURE: 56 MMHG

## 2018-03-21 VITALS
RESPIRATION RATE: 21 BRPM | SYSTOLIC BLOOD PRESSURE: 103 MMHG | HEART RATE: 90 BPM | DIASTOLIC BLOOD PRESSURE: 67 MMHG | OXYGEN SATURATION: 100 % | TEMPERATURE: 97.5 F

## 2018-03-21 VITALS
HEART RATE: 88 BPM | RESPIRATION RATE: 18 BRPM | SYSTOLIC BLOOD PRESSURE: 113 MMHG | OXYGEN SATURATION: 100 % | DIASTOLIC BLOOD PRESSURE: 53 MMHG

## 2018-03-21 VITALS
OXYGEN SATURATION: 94 % | DIASTOLIC BLOOD PRESSURE: 56 MMHG | RESPIRATION RATE: 22 BRPM | TEMPERATURE: 98 F | HEART RATE: 66 BPM | SYSTOLIC BLOOD PRESSURE: 105 MMHG

## 2018-03-21 VITALS
TEMPERATURE: 98.1 F | RESPIRATION RATE: 22 BRPM | DIASTOLIC BLOOD PRESSURE: 54 MMHG | OXYGEN SATURATION: 95 % | SYSTOLIC BLOOD PRESSURE: 103 MMHG | HEART RATE: 109 BPM

## 2018-03-21 VITALS
RESPIRATION RATE: 16 BRPM | HEART RATE: 85 BPM | OXYGEN SATURATION: 94 % | DIASTOLIC BLOOD PRESSURE: 57 MMHG | SYSTOLIC BLOOD PRESSURE: 112 MMHG | TEMPERATURE: 97.1 F

## 2018-03-21 LAB
BASOPHILS # BLD AUTO: 0 TH/MM3 (ref 0–0.2)
BASOPHILS NFR BLD: 0.6 % (ref 0–2)
BUN SERPL-MCNC: 14 MG/DL (ref 7–18)
CALCIUM SERPL-MCNC: 8.6 MG/DL (ref 8.5–10.1)
CHLORIDE SERPL-SCNC: 101 MEQ/L (ref 98–107)
CREAT SERPL-MCNC: 0.74 MG/DL (ref 0.5–1)
EOSINOPHIL # BLD: 0.1 TH/MM3 (ref 0–0.4)
EOSINOPHIL NFR BLD: 1.8 % (ref 0–4)
ERYTHROCYTE [DISTWIDTH] IN BLOOD BY AUTOMATED COUNT: 23.9 % (ref 11.6–17.2)
GFR SERPLBLD BASED ON 1.73 SQ M-ARVRAT: 76 ML/MIN (ref 89–?)
GLUCOSE SERPL-MCNC: 87 MG/DL (ref 74–106)
HCO3 BLD-SCNC: 30.3 MEQ/L (ref 21–32)
HCT VFR BLD CALC: 24.3 % (ref 35–46)
HGB BLD-MCNC: 7.6 GM/DL (ref 11.6–15.3)
LYMPHOCYTES # BLD AUTO: 0.6 TH/MM3 (ref 1–4.8)
LYMPHOCYTES NFR BLD AUTO: 17.6 % (ref 9–44)
MCH RBC QN AUTO: 25.9 PG (ref 27–34)
MCHC RBC AUTO-ENTMCNC: 31.5 % (ref 32–36)
MCV RBC AUTO: 82.2 FL (ref 80–100)
MONOCYTE #: 0.4 TH/MM3 (ref 0–0.9)
MONOCYTES NFR BLD: 11 % (ref 0–8)
NEUTROPHILS # BLD AUTO: 2.5 TH/MM3 (ref 1.8–7.7)
NEUTROPHILS NFR BLD AUTO: 69 % (ref 16–70)
PLATELET # BLD: 178 TH/MM3 (ref 150–450)
PMV BLD AUTO: 7 FL (ref 7–11)
RBC # BLD AUTO: 2.95 MIL/MM3 (ref 4–5.3)
SODIUM SERPL-SCNC: 139 MEQ/L (ref 136–145)
WBC # BLD AUTO: 3.7 TH/MM3 (ref 4–11)

## 2018-03-21 PROCEDURE — 30233N1 TRANSFUSION OF NONAUTOLOGOUS RED BLOOD CELLS INTO PERIPHERAL VEIN, PERCUTANEOUS APPROACH: ICD-10-PCS | Performed by: EMERGENCY MEDICINE

## 2018-03-21 PROCEDURE — 0W9B3ZZ DRAINAGE OF LEFT PLEURAL CAVITY, PERCUTANEOUS APPROACH: ICD-10-PCS | Performed by: RADIOLOGY

## 2018-03-21 RX ADMIN — LEVOTHYROXINE SODIUM SCH MCG: 150 TABLET ORAL at 06:50

## 2018-03-21 RX ADMIN — PANTOPRAZOLE SCH MG: 40 TABLET, DELAYED RELEASE ORAL at 22:22

## 2018-03-21 RX ADMIN — Medication SCH ML: at 22:26

## 2018-03-21 RX ADMIN — HYDROCODONE BITARTRATE AND ACETAMINOPHEN PRN TAB: 7.5; 325 TABLET ORAL at 17:59

## 2018-03-21 RX ADMIN — EZETIMIBE SCH MG: 10 TABLET ORAL at 08:40

## 2018-03-21 RX ADMIN — METOPROLOL TARTRATE SCH MG: 25 TABLET, FILM COATED ORAL at 21:00

## 2018-03-21 RX ADMIN — Medication SCH ML: at 08:40

## 2018-03-21 RX ADMIN — AMIODARONE HYDROCHLORIDE SCH MG: 200 TABLET ORAL at 08:42

## 2018-03-21 RX ADMIN — PANTOPRAZOLE SCH MG: 40 TABLET, DELAYED RELEASE ORAL at 08:43

## 2018-03-21 RX ADMIN — METOPROLOL TARTRATE SCH MG: 25 TABLET, FILM COATED ORAL at 08:43

## 2018-03-21 RX ADMIN — HYDROCODONE BITARTRATE AND ACETAMINOPHEN PRN TAB: 7.5; 325 TABLET ORAL at 22:25

## 2018-03-21 NOTE — HHI.PR
Subjective


Remarks


F/U pleural effusion. Tolerated thoracentesis but complains of pain relieved 

with lortab I prescribed. Still with fatigue awaiting Dr Darek blackmon RN





Objective


Vitals





Vital Signs








  Date Time  Temp Pulse Resp B/P (MAP) Pulse Ox O2 Delivery O2 Flow Rate FiO2


 


3/21/18 16:00 97.1 85 16 112/57 (75) 94   


 


3/21/18 12:37 97.8 68 17 105/56 (72) 96   


 


3/21/18 12:34 98.0 66 22 105/56 (72) 94   


 


3/21/18 12:19 98.6 66 22 121/53 (75) 95   


 


3/21/18 09:04 97.5 90 21 103/67 (79) 100   


 


3/21/18 02:22  88 18 113/53 (73) 100   


 


3/20/18 22:16  90 18 120/87 (98) 98   














I/O      


 


 3/20/18 3/20/18 3/20/18 3/21/18 3/21/18 3/21/18





 07:00 15:00 23:00 07:00 15:00 23:00


 


Intake Total      240 ml


 


Balance      240 ml


 


      


 


Intake Oral      240 ml


 


# Voids      0


 


# Bowel Movements      0








Result Diagram:  


3/21/18 0400                                                                   

             3/21/18 0400





Imaging





Last Impressions








Thoracentesis Ultrasound 3/21/18 0000 Signed





Impressions: 





 Service Date/Time:  Wednesday, March 21, 2018 11:29 - CONCLUSION: 

Uncomplicated 





 ultrasound guided thoracentesis.       Da Lao MD 


 


Chest X-Ray 3/21/18 0000 Signed





Impressions: 





 Service Date/Time:  Wednesday, March 21, 2018 12:27 - CONCLUSION:  Status post 





 left thoracentesis. No pneumothorax.     Bruno Raymundo MD 


 


Chest Ultrasound 3/20/18 0000 Signed





Impressions: 





 Service Date/Time:  Tuesday, March 20, 2018 18:54 - CONCLUSION:  1. Marked 





 placed over large left effusion with patient sitting up the side of the bed. 





 Estimated fluid volume 1780 mL.     Daren Hanks MD 








Objective Remarks


GENERAL: This is a well-nourished, well-developed patient, in no apparent 

distress.


SKIN: No rashes, ecchymoses or lesions. Cool and dry.


CARDIOVASCULAR: Regular rate and rhythm without gallops, or rubs.  Systolic 

murmur loudest at aortic area.


RESPIRATORY: Bilaterally decreased air entry.


GASTROINTESTINAL: Abdomen soft, non-tender, nondistended. No guarding.


MUSCULOSKELETAL: Extremities without clubbing, cyanosis, or edema. No calf 

tenderness. 


NEUROLOGICAL: Awake and alert. Motor and sensory grossly within normal limits. 

Normal speech.


Procedures


Ultrasound-guided thoracentesis





A/P


Problem List:  


(1) Pleural effusion


ICD Code:  J90 - Pleural effusion, not elsewhere classified


Status:  Acute


Assessment and Plan


Recurrent pleural effusion previous cytology negative in a patient with a 

history of progressive lung cancer.  She tolerated thoracentesis and feels 

better with less shortness of breath.  She was evaluated by pulmonary who 

recommended pleurodesis


Symptomatic anemia.  Awaiting Heme Onc evaluation.  She will need blood 

transfusion


Heart murmur on examination.  Patient reports she is aware of this and follows 

up with Dr. Rivera.  She had routine echoes serially.  Her dyspnea was not 

secondary to valvular disease.


Mild leukopenia.  Monitor


Chronic medical conditions of htn, cad s/p MI 1987, Afib, RLE DVT- 1985, 

hypothyroidism, seizures and AVMs.  Continue outpatient medications upper


DVT prophylaxis with ambulation/SCD.


GI prophylaxis on pantoprazole.


Discharge Planning


Possible dc in am











Domo Abraham MD Mar 21, 2018 19:18

## 2018-03-21 NOTE — EKG
Date Performed: 03/20/2018       Time Performed: 15:32:54

 

PTAGE:      78 years

 

EKG:      ATRIAL FIBRILLATION MODERATE INTRAVENTRICULAR CONDUCTION DELAY ST DEVIATION AND MODERATE T-
WAVE ABNORMALITY, CONSIDER LATERAL ISCHEMIA ST DEVIATION AND MODERATE T-WAVE ABNORMALITY, CONSIDER IN
FERIOR ISCHEMIA ABNORMAL ECG

 

PREVIOUS TRACING       : 01/25/2018 12.52 Since the previous tracing, no significant change noted

 

DOCTOR:   Francisco Javier Camacho  Interpretating Date/Time  03/21/2018 20:45:50

## 2018-03-21 NOTE — HHI.HP
__________________________________________________





HPI


Service


[facility]


Primary Care Physician


Shayan Lyles MD


Admission Diagnosis





acute on chronic pleural effusion, hypoxia, cancer patient


Diagnoses:  


Travel History


International Travel<30 Days:  No


Contact w/Intl Traveler <30 Da:  No


Traveled to Known Affected Are:  No


History of Present Illness


History from patient, and review of medical records.





Patient reported that she came to the hospital because she has been short of 

breath for the past 2 weeks.


She reports she has had pleural effusions previously and needed to get it 

drained.  However the insurance company required for her to wait for 10 days 

before she gets approval to get thoracocentesis as an outpatient.


She reports that her dyspnea was getting worse and worse and could no longer 

wait for this and therefore decided to come to emergency room.


Patient also reports of extreme generalized weakness because of this dyspnea.


She reports she can't walk from one spot to the other spot in the house because 

of this.


She denies any chest pains or palpitations.  Did feel some dizziness.


She even fell down a few days ago.


Denies syncope.


Denies any copious cough or sputum production.  Denies fever.





Patient reports of chronic anemia.  She states that last week she saw her 

hematologist and was planned to have her iron infusion done today.  However she 

missed this appointment because of pain in ER.


She denies seeing any blood in her stool or in her urine.


She reports of history of AVMs for which she has had cauterized several times.  

Finally her doctors had to take her off Plavix because of this acute GI bleeds.

  Plavix was stopped over a month ago.





Review of Systems


Except as stated in HPI:  all other systems reviewed are Neg





Past Family Social History


Past Medical History


left lung cancer - s/p radiation therapy- was in remission 3 yrs


right lung cancer- s/p radiation therapy 3 weeks ago finished


htn


dm


cad s/p MI 


Afib


RLE DVT- 


hypothyroidism


seizures


AVMs


Past Surgical History


brain aneurysm 


AVM cauterizations


PAD stenting 


tonsilectomy


lung biopsy


thoracocentesis


Allergies:  


Coded Allergies:  


     No Known Allergies (Verified  Allergy, Unknown, 10/30/17)


Family History


parents- heart disese


brother- lung cancer - agent orange


another brother- bladder cancer


sister- cabg, bowel resection - short bowel syndrome


other brother- prostate cancer , gout 


son- oldest- heart condition, dm, back issues


Social History


used to smoke for 60yrs, quit about 3.5 yrs ago 


no etoh abuse


no drugs


lives with youngest son and his wife and 2 gransons





Physical Exam


Vital Signs





Vital Signs








  Date Time  Temp Pulse Resp B/P (MAP) Pulse Ox O2 Delivery O2 Flow Rate FiO2


 


3/21/18 02:22  88 18 113/53 (73) 100   


 


3/20/18 22:16  90 18 120/87 (98) 98   


 


3/20/18 18:17  74 22  97 Nasal Cannula 2.00 


 


3/20/18 18:15  77 24 133/60 (84) 100 Nasal Cannula 2.00 


 


3/20/18 13:40 98.3 80 24 113/53 (73) 98   








Physical Exam


GENERAL: This is a well-nourished, well-developed patient, in no apparent 

distress.


SKIN: No rashes, ecchymoses or lesions. Cool and dry.


HEAD: Atraumatic. Normocephalic. No temporal or scalp tenderness.


EYES: No scleral icterus. No injection or drainage. 


ENT: Nose without bleeding, purulent drainage or septal hematoma. Airway patent.


NECK: Trachea midline. No JVD Supple, nontender, no meningeal signs.


CARDIOVASCULAR: Regular rate and rhythm without gallops, or rubs.  Systolic 

murmur loudest at aortic area.


RESPIRATORY: Bilaterally decreased air entry.


GASTROINTESTINAL: Abdomen soft, non-tender, nondistended. No guarding.


MUSCULOSKELETAL: Extremities without clubbing, cyanosis, or edema. No calf 

tenderness. 


NEUROLOGICAL: Awake and alert. Motor and sensory grossly within normal limits. 

Normal speech.


Laboratory





Laboratory Tests








Test


  3/20/18


15:37


 


White Blood Count 3.9 


 


Red Blood Count 2.90 


 


Hemoglobin 7.5 


 


Hematocrit 24.3 


 


Mean Corpuscular Volume 83.6 


 


Mean Corpuscular Hemoglobin 25.7 


 


Mean Corpuscular Hemoglobin


Concent 30.8 


 


 


Red Cell Distribution Width 24.5 


 


Platelet Count 198 


 


Mean Platelet Volume 6.9 


 


Neutrophils (%) (Auto) 73.1 


 


Lymphocytes (%) (Auto) 13.5 


 


Monocytes (%) (Auto) 11.3 


 


Eosinophils (%) (Auto) 0.9 


 


Basophils (%) (Auto) 1.2 


 


Neutrophils # (Auto) 2.8 


 


Lymphocytes # (Auto) 0.5 


 


Monocytes # (Auto) 0.4 


 


Eosinophils # (Auto) 0.0 


 


Basophils # (Auto) 0.0 


 


CBC Comment AUTO DIFF 


 


Differential Comment


  AUTO DIFF


CONFIRMED


 


Platelet Estimate NORMAL 


 


Platelet Morphology Comment NORMAL 


 


Ovalocytes 1+ 


 


Keratocytes OCC 


 


Prothrombin Time 10.8 


 


Prothromb Time International


Ratio 1.1 


 


 


Activated Partial


Thromboplast Time 22.5 


 


 


Blood Urea Nitrogen 16 


 


Creatinine 0.72 


 


Random Glucose 88 


 


Total Protein 7.3 


 


Albumin 3.1 


 


Calcium Level 9.0 


 


Alkaline Phosphatase 163 


 


Aspartate Amino Transf


(AST/SGOT) 22 


 


 


Alanine Aminotransferase


(ALT/SGPT) 23 


 


 


Total Bilirubin 0.1 


 


Sodium Level 138 


 


Potassium Level 4.4 


 


Chloride Level 102 


 


Carbon Dioxide Level 27.6 


 


Anion Gap 8 


 


Estimat Glomerular Filtration


Rate 79 


 


 


Total Creatine Kinase 37 


 


Troponin I LESS THAN 0.02 








Result Diagram:  


3/20/18 1537                                                                   

             3/20/18 1537





Imaging





Last 48 hours Impressions








Chest X-Ray 3/20/18 1342 Signed





Impressions: 





 Service Date/Time:  2018 14:05 - CONCLUSION:  Persistent 





 parenchymal consolidation and effusion the left lung base. Pulmonary venous 





 congestion.     Bruno Raymundo MD 


 


Chest Ultrasound 3/20/18 0000 Signed





Impressions: 





 Service Date/Time:  2018 18:54 - CONCLUSION:  1. Marked 





 placed over large left effusion with patient sitting up the side of the bed. 





 Estimated fluid volume 1780 mL.     Daren Hanks MD 











Capjuan ramoni VTE Risk Assessment


Caprini VTE Risk Assessment:  Mod/High Risk (score >= 2)


Caprini Risk Assessment Model











 Point Value = 1          Point Value = 2  Point Value = 3  Point Value = 5


 


Age 41-60


Minor surgery


BMI > 25 kg/m2


Swollen legs


Varicose veins


Pregnancy or postpartum


History of unexplained or recurrent


   spontaneous 


Oral contraceptives or hormone


   replacement


Sepsis (< 1 month)


Serious lung disease, including


   pneumonia (< 1 month)


Abnormal pulmonary function


Acute myocardial infarction


Congestive heart failure (< 1 month)


History of inflammatory bowel disease


Medical patient at bed rest Age 61-74


Arthroscopic surgery


Major open surgery (> 45 min)


Laparoscopic surgery (> 45 min)


Malignancy


Confined to bed (> 72 hours)


Immobilizing plaster cast


Central venous access Age >= 75


History of VTE


Family history of VTE


Factor V Leiden


Prothrombin 73702H


Lupus anticoagulant


Anticardiolipin antibodies


Elevated serum homocysteine


Heparin-induced thrombocytopenia


Other congenital or acquired


   thrombophilia Stroke (< 1 month)


Elective arthroplasty


Hip, pelvis, or leg fracture


Acute spinal cord injury (< 1 month)








Prophylaxis Regimen











   Total Risk


Factor Score Risk Level Prophylaxis Regimen


 


0-1      Low Early ambulation


 


2 Moderate Order ONE of the following:


*Sequential Compression Device (SCD)


*Heparin 5000 units SQ BID


 


3-4 Higher Order ONE of the following medications:


*Heparin 5000 units SQ TID


*Enoxaparin/Lovenox 40 mg SQ daily (WT < 150 kg, CrCl > 30 mL/min)


*Enoxaparin/Lovenox 30 mg SQ daily (WT < 150 kg, CrCl > 10-29 mL/min)


*Enoxaparin/Lovenox 30 mg SQ BID (WT < 150 kg, CrCl > 30 mL/min)


AND/OR


*Sequential Compression Device (SCD)


 


5 or more Highest Order ONE of the following medications:


*Heparin 5000 units SQ TID (Preferred with Epidurals)


*Enoxaparin/Lovenox 40 mg SQ daily (WT < 150 kg, CrCl > 30 mL/min)


*Enoxaparin/Lovenox 30 mg SQ daily (WT < 150 kg, CrCl > 10-29 mL/min)


*Enoxaparin/Lovenox 30 mg SQ BID (WT < 150 kg, CrCl > 30 mL/min)


AND


*Sequential Compression Device (SCD)











Assessment and Plan


Assessment and Plan


Impression:





Malignant pleural effusion recurrent.


Symptomatic anemia.


Heart murmur on examination.  Patient reports she is aware of this and follows 

up with Dr. Rivera.  She had routine echoes serially.  Her dyspnea was not 

secondary to valvular disease.








left lung cancer - s/p radiation therapy- was in remission 3 yrs


right lung cancer- s/p radiation therapy 3 weeks ago finished


htn


dm


cad s/p MI 


Afib


RLE DVT- 


hypothyroidism


seizures


AVMs














Plan:





Ultrasound guided left thoracocentesis today.


Pulmonary consult.  Patient likely would need pleurodesis versus drain


Oncology clinic notes reviewed.  Patient is symptomatic in her anemia.  Was 

planned prior and infusion today.  Would consult patient's oncologist whether 

this could be done in hospital to relieve her symptoms.





Resume her home meds.


Hold blood thinners.


For now, continue Lasix at 40 mg IV twice a day until thoracocentesis is done.





DVT prophylaxis with ambulation/SCD.


GI prophylaxis on pantoprazole.


Discussed Condition With


patient, ER MD, nursing staff





Physician Certification


2 Midnight Certification Type:  Admission for Inpatient Services


Order for Inpatient Services


The services are ordered in accordance with Medicare regulations or non-

Medicare payer requirements, as applicable.  In the case of services not 

specified as inpatient-only, they are appropriately provided as inpatient 

services in accordance with the 2-midnight benchmark.


Estimated LOS (days):  2


 days is the estimated time the patient will need to remain in the hospital, 

assuming treatment plan goals are met and no additional complications.


Post-Hospital Plan:  Home











Ryan Blanco MD Mar 21, 2018 03:50

## 2018-03-21 NOTE — RADRPT
EXAM DATE/TIME:  03/21/2018 11:29 

 

[facility] COMPARISON:     

No previous studies available for comparison.

 

EXTERNAL COMPARISON: 

[facility], XR CHEST PA & LAT, Mar 9 2018, CT CHEST W/O CONTRAST, November 13, 2017, May 11,
 2017, January 16, 2017, CT CHEST W/CONTRAST, September 09, 2016, [facility], PET/CT WHOLE YOLY
DY, December 04, 2017, May 09, 2016, PET/CT TUMOR, June 19, 2015, December 16, 2014.

 

 

INDICATIONS :      

Left pleural effusion. 

                        

                        

 

 

 

MEDICAL HISTORY :     

Hypercholesterolemia. Carcinoma, lung. Myocardial infarction. HTN. Thyroid disease. Hiatal hernia. Af
ib. COPD. DVT. Pleural effusion.  

 

SURGICAL HISTORY :     

Tonsillectomy. Angioplasty.  Brain aneurysm repair. Cardiac cath. Stomach stents/bypass. Thoracentesi
s. 

 

ENCOUNTER:     

Subsequent

 

ACUITY:     

2 months

 

PAIN SCORE:     

2/10

 

LOCATION:     

Left chest

                        

 

FLUID:

Total volume of 1200 cc of clear, yellow fluid was removed.

Fluid was sent to lab for ordered studies. 

 

 

TECHNIQUE:  

1.  Ultrasound guidance for thoracentesis.  

2.  Thoracentesis.

 

The risks, benefits, and alternatives to ultrasound guided thoracentesis were explained to the patien
t in lay simple terms, including the risk of bleeding and infection.  Written and verbal informed con
sent was obtained.  

 

Appropriate area for thoracentesis was marked under ultrasound guidance with the patient in the uprig
ht position.  Overlying skin was prepped and draped in the usual sterile fashion and with local anest
hetic, a dermatotomy was made with an 11 blade scalpel.  A 6 Lao thoracentesis catheter was placed
 in the pleural space and fluid was removed.  Catheter was then removed and a sterile dressing applie
d. There were no immediate complications.  The patient tolerated the procedure well and the left the 
ultrasound suite in stable condition.  Chest radiograph is to be obtained. 

 

CONCLUSION:     Uncomplicated ultrasound guided thoracentesis.  

 

 

 

 Da Lao MD on March 21, 2018 at 12:31           

Board Certified Radiologist.

 This report was verified electronically.

## 2018-03-22 VITALS
HEART RATE: 82 BPM | RESPIRATION RATE: 20 BRPM | DIASTOLIC BLOOD PRESSURE: 53 MMHG | TEMPERATURE: 96.4 F | SYSTOLIC BLOOD PRESSURE: 120 MMHG | OXYGEN SATURATION: 96 %

## 2018-03-22 VITALS
DIASTOLIC BLOOD PRESSURE: 71 MMHG | RESPIRATION RATE: 16 BRPM | SYSTOLIC BLOOD PRESSURE: 121 MMHG | OXYGEN SATURATION: 93 % | TEMPERATURE: 97.5 F | HEART RATE: 97 BPM

## 2018-03-22 VITALS
RESPIRATION RATE: 16 BRPM | TEMPERATURE: 97.3 F | OXYGEN SATURATION: 97 % | SYSTOLIC BLOOD PRESSURE: 112 MMHG | DIASTOLIC BLOOD PRESSURE: 51 MMHG | HEART RATE: 83 BPM

## 2018-03-22 VITALS
DIASTOLIC BLOOD PRESSURE: 57 MMHG | OXYGEN SATURATION: 94 % | SYSTOLIC BLOOD PRESSURE: 119 MMHG | HEART RATE: 88 BPM | TEMPERATURE: 97.5 F | RESPIRATION RATE: 22 BRPM

## 2018-03-22 VITALS
TEMPERATURE: 96.4 F | SYSTOLIC BLOOD PRESSURE: 120 MMHG | RESPIRATION RATE: 20 BRPM | HEART RATE: 82 BPM | DIASTOLIC BLOOD PRESSURE: 53 MMHG | OXYGEN SATURATION: 96 %

## 2018-03-22 VITALS
SYSTOLIC BLOOD PRESSURE: 106 MMHG | TEMPERATURE: 97.3 F | OXYGEN SATURATION: 94 % | RESPIRATION RATE: 16 BRPM | HEART RATE: 82 BPM | DIASTOLIC BLOOD PRESSURE: 81 MMHG

## 2018-03-22 LAB
ACANTHOCYTES BLD QL SMEAR: (no result)
BASOPHILS # BLD AUTO: 0 TH/MM3 (ref 0–0.2)
BASOPHILS NFR BLD: 0.8 % (ref 0–2)
BUN SERPL-MCNC: 15 MG/DL (ref 7–18)
CALCIUM SERPL-MCNC: 8.6 MG/DL (ref 8.5–10.1)
CHLORIDE SERPL-SCNC: 97 MEQ/L (ref 98–107)
CREAT SERPL-MCNC: 0.78 MG/DL (ref 0.5–1)
EOSINOPHIL # BLD: 0.1 TH/MM3 (ref 0–0.4)
EOSINOPHIL NFR BLD: 3.1 % (ref 0–4)
ERYTHROCYTE [DISTWIDTH] IN BLOOD BY AUTOMATED COUNT: 21.6 % (ref 11.6–17.2)
GFR SERPLBLD BASED ON 1.73 SQ M-ARVRAT: 72 ML/MIN (ref 89–?)
GLUCOSE SERPL-MCNC: 99 MG/DL (ref 74–106)
HCO3 BLD-SCNC: 28.9 MEQ/L (ref 21–32)
HCT VFR BLD CALC: 27.6 % (ref 35–46)
HGB BLD-MCNC: 8.9 GM/DL (ref 11.6–15.3)
LYMPHOCYTES # BLD AUTO: 0.4 TH/MM3 (ref 1–4.8)
LYMPHOCYTES NFR BLD AUTO: 10.5 % (ref 9–44)
MAGNESIUM SERPL-MCNC: 1.9 MG/DL (ref 1.5–2.5)
MCH RBC QN AUTO: 26.2 PG (ref 27–34)
MCHC RBC AUTO-ENTMCNC: 32.5 % (ref 32–36)
MCV RBC AUTO: 80.8 FL (ref 80–100)
MONOCYTE #: 0.4 TH/MM3 (ref 0–0.9)
MONOCYTES NFR BLD: 10.4 % (ref 0–8)
NEUTROPHILS # BLD AUTO: 2.7 TH/MM3 (ref 1.8–7.7)
NEUTROPHILS NFR BLD AUTO: 75.2 % (ref 16–70)
OVALOCYTES BLD QL SMEAR: (no result)
PLATELET # BLD: 198 TH/MM3 (ref 150–450)
PMV BLD AUTO: 7 FL (ref 7–11)
RBC # BLD AUTO: 3.41 MIL/MM3 (ref 4–5.3)
SODIUM SERPL-SCNC: 135 MEQ/L (ref 136–145)
WBC # BLD AUTO: 3.6 TH/MM3 (ref 4–11)

## 2018-03-22 RX ADMIN — HYDROCODONE BITARTRATE AND ACETAMINOPHEN PRN TAB: 7.5; 325 TABLET ORAL at 14:11

## 2018-03-22 RX ADMIN — Medication SCH ML: at 09:19

## 2018-03-22 RX ADMIN — AMIODARONE HYDROCHLORIDE SCH MG: 200 TABLET ORAL at 09:18

## 2018-03-22 RX ADMIN — PANTOPRAZOLE SCH MG: 40 TABLET, DELAYED RELEASE ORAL at 09:18

## 2018-03-22 RX ADMIN — HYDROCODONE BITARTRATE AND ACETAMINOPHEN PRN TAB: 7.5; 325 TABLET ORAL at 05:13

## 2018-03-22 RX ADMIN — METOPROLOL TARTRATE SCH MG: 25 TABLET, FILM COATED ORAL at 09:18

## 2018-03-22 RX ADMIN — LEVOTHYROXINE SODIUM SCH MCG: 150 TABLET ORAL at 05:12

## 2018-03-22 RX ADMIN — EZETIMIBE SCH MG: 10 TABLET ORAL at 09:18

## 2018-03-22 NOTE — MB
cc:

Familia Calzada MD

****

 

 

DATE:

03/21/2018

 

REASON FOR CONSULTATION:

Patient with a past medical history of recurrent lung cancer.  This is

a poorly-differentiated adenocarcinoma.  Also with history of 

iron-deficiency anemia and history of AVMs.

 

HISTORY OF PRESENT ILLNESS:

This is a 78-year-old female with a history of recurrent lung cancer 

and left-sided pleural effusions, history of anemia and AVMs and 

history of AFib who presents to the emergency department with 

increasing dyspnea.  In the emergency department, a chest x-ray was 

obtained, which showed persistent parenchymal consolidation and large 

effusion in the left lung base.  The patient subsequently underwent 

ultrasound-guided thoracentesis and approximately 1400 mL of fluid 

were removed.  Cytology is pending.  This was her third thoracentesis 

since December 2017.  The patient recently had recurrence of her lung 

cancer.  She was found to have an abnormal PET scan with left upper 

lung lesion that was approximately 2.7 x 2.1 cm and was 

hypermetabolic.  She received SBRT treatments to this lesion.  There 

was also a mass in the posterior right lung.  The patient has a 

complicated history of iron-deficiency anemia with bleeding AVMs.  She

does have AFib, but anticoagulation was stopped due to the current 

severe anemia and bleeding from the AVMs.  She has received packed red

blood cell transfusions in the past.  She has also received iron 

infusions.  The patient is currently awake and alert and she feels 

significantly better after drainage of the pleural fluid.  She feels 

weak.  On admission, her hemoglobin was 7.6.  Her blood pressure has 

also been borderline and she is tachycardic.  She denies any 

nosebleeds, gum bleeds, petechiae, bruising, no bright red blood per 

rectum or melena.

 

REVIEW OF SYSTEMS:

A comprehensive review of systems was completed, which is negative 

except as described in the HPI.

 

PAST MEDICAL HISTORY:

History of lung cancer with progressive disease, hypertension, 

diabetes, coronary artery disease, status post MI in 1987, AFib, right

lower extremity DVT in 1985, hypothyroidism, seizures, AVMs.

 

PAST SURGICAL HISTORY:

Brain aneurysm in 1994, AVM cauterization, peripheral arterial disease

stenting, tonsillectomy, history of lung biopsies, thoracentesis x2.

 

FAMILY HISTORY:

Was reviewed.  It is significant for heart disease in his parents.  

Brother had lung cancer.  Second brother had bladder cancer.  Sister 

had heart disease with CABG.  Brother had prostate cancer.

 

SOCIAL HISTORY:

She has more than a 60-pack-year smoking history.  She quit 

approximately 5 years ago.  She does not drink alcohol.  No illicit 

drug use.  She lives with her son and his wife.

 

MEDICATIONS:

1.  Digoxin 0.125 mg daily.

2.  Atorvastatin 80 mg p.o. at bedtime.

3.  Cardizem 240 p.o. q.h.s.

4.  Dilantin 400 mg p.o. q.h.s.

5.  Tylenol 650 p.o. q6 hours p.r.n.

6.  [place] 5/325 p.o. q4 hours p.r.n.

7.  Morphine sulfate 1 mg IV q3 hours p.r.n.

8.  Amiodarone 200 mg p.o. daily.

9.  Zetia 10 mg p.o. daily.

10.  Metoprolol 25 mg p.o. b.i.d.

11.  Pantoprazole 40 mg p.o. b.i.d.

12.  Levothyroxine 150 micrograms p.o. daily.

 

ALLERGIES:

NO KNOWN DRUG ALLERGIES.

 

VITAL SIGNS:

Blood pressure is 1103/54, pulse is in the 100s, temperature is 98.1, 

respiratory rate is 22, pulse oximetry shows 95% in room air.

 

GENERAL:

Elderly thin female in no apparent distress.

 

HEENT:

Pupils are equal, round and reactive to light.  EOMI.  No thrush, no 

lesions.

 

NECK:

Is supple.  No JVD.  No bruits.

 

CARDIAC:

S1, S2, tachycardic.

 

CHEST:

Is clear to auscultation bilaterally.

 

ABDOMEN:

Is soft, nontender, nondistended.  Bowel sounds are present.

 

EXTREMITIES:

Without any edema, erythema or cyanosis.

 

SKIN:

Without any petechiae, lesions or bruises.

 

NEUROLOGIC:

No focal deficits.

 

PSYCHIATRIC:

Mood and affect are appropriate.

 

No lymphadenopathy on exam.

 

LABORATORIES:

WBC 3.7, hemoglobin is 7.6, platelet count is 178.  Serum chemistry 

shows sodium 139, potassium 3.9, chloride 101, CO2 at 30.3, anion gap 

is 8, creatinine is 0.74, GFR is 76, glucose is 87.  TSH is elevated 

at 4.08.

 

ASSESSMENT AND PLAN:

This is a 78-year-old female who has recurrent lung cancer.  She has 

also had recurrent pleural effusions.  Cytology in the past has been 

negative.  History of anemia with gastrointestinal bleeding secondary 

to arteriovenous malformations and history of atrial fibrillation who 

presents to the emergency department with progressive dyspnea.

 

1.  Recurrent pleural effusion in the left lung:  She has undergone 

thoracentesis with approximately 1400 mL of fluid removed.  Cytology 

is pending.  This is her third  thoracentesis.  Since she is having 

recurrent pleural effusions, I think that she should be a good 

candidate for pleurodesis.  I have discussed this with the patient.  

She is open to this option.  We will ask Interventional Radiology for 

talc pleurodesis.  If the cytology comes back positive for malignancy,

she will need systemic chemotherapy versus immunotherapy outpatient.  

I have discussed this with the patient.

2.  Severe anemia, which is symptomatic:  Hemoglobin is in the 7 

range.  She is tachycardic.  She has borderline low blood pressure.  I

will proceed with giving her 1 unit of packed red blood cells.  She 

has a history of severe iron deficiency.  We will give her iron 

infusions during her admission to the hospital.  She will require 

further iron infusions outpatient.

3.  History of gastrointestinal bleeding with arteriovenous 

malformations:  We may need to ask Gastroenterology for their opinion.

 The patient is not having any overt bleed at this time.

4.  History of atrial fibrillation, currently not on anticoagulation, 

due to gastrointestinal bleeding.

 

Thank you for allowing me to participate in the care of this patient. 

I will continue to follow this patient along.

 

 

__________________________________

MD JULIA Larsen/AWAIS

D: 03/21/2018, 11:59 PM

T: 03/22/2018, 12:38 AM

Visit #: [id number]

Job #: [id number]

## 2018-03-22 NOTE — MB
cc:

Andrade Sampson MD

****

 

 

DATE:

03/21/2018

 

REQUESTING PHYSICIAN:

Dr. Blanco.

 

REASON FOR CONSULTATION:

Evaluate for pleural effusion.

 

HISTORY OF PRESENT ILLNESS:

Ms. Ruiz is a pleasant 78-year-old female who is known to me from 

the office.  She has a history of CA of the lung.  She has received 

SBRT 10 treatment by Dr. Ruiz Mccauley.  She also has history of COPD, 

coronary artery disease.  She had pleural effusion, had a 

thoracentesis done in January.  Now, she came with increasing 

shortness of breath.  No chest pain.  No fever or chills.  No night 

sweats.

 

The patient had a thoracentesis done by interventional radiology and 

1200 mL of the fluid was removed.  She feels better now.

 

PAST MEDICAL HISTORY:

Significant history of COPD, CA of the lung, status post SBRT, atrial 

fibrillation, coronary artery disease, gastroesophageal reflux, brain 

aneurysm, history of tonsillectomy.

 

MEDICATIONS:

She is currently taking digoxin 0.125 mg, Lipitor 80 mg a day, 

diltiazem 240 mg b.i.d., Dilantin 400 mg at nighttime, Norco for pain,

amiodarone 200 mg a day, Zetia 10 mg a day, metoprolol 25 mg twice a 

day, Protonix 40 mg and levothyroxine 150 mcg a day.

 

ALLERGIES:

NO KNOWN DRUG ALLERGIES.

 

SOCIAL HISTORY:

She is retired.  She worked as a pharmacy technician.  She has a 

history of smoking 1 pack for 60 years, which she quit.  No alcohol 

abuse.

 

FAMILY HISTORY:

She has 2 children.  She lives with her son and daughter-in-law.

 

REVIEW OF SYSTEMS:

She has lost weight, feels weak.  No chest pain.  No nausea or 

vomiting.

 

PHYSICAL EXAMINATION:

GENERAL:  Elderly female, not in acute distress.

VITAL SIGNS:  Blood pressure 112/57, heart rate 84, respirations 16, 

temperature 97.1.

HEENT:  Pupils are equal and reactive to light.  Oral mucosa nasal 

mucosa normal.

NECK:  Supple, JVP not raised.

CHEST:  Equal bilateral air entry.  No rhonchi.

CARDIOVASCULAR:  S1, S2 normal.

ABDOMEN:  Soft, nontender, nondistended.  Bowel sounds are present.

EXTREMITIES:  No edema.

 

IMPRESSION:

1.  Recurrent left pleural effusion, status post thoracentesis.

2.  Carcinoma of the lung., status post stereotactic body radiation 

therapy.

3.  Chronic obstructive pulmonary disease.

4.  Anemia.

5.  Coronary artery disease.

6.  Gastroesophageal reflux.

7.  History of brain aneurysm.

 

PLAN:

I discussed with the patient.  She already had a thoracentesis done 

and breathing is better.  If she has a reaccumulation of the fluid, 

then would consider chest tube placement and pleurodesis.  Continue 

other medications.  Wean off oxygen.  Further treatment will depend on

the course in the hospital.

 

Thank you Dr. Blanco for this consult.

 

 

__________________________________

Andrade Sampson MD

 

 

ADA/rt

D: 03/21/2018, 08:33 PM

T: 03/21/2018, 09:23 PM

Visit #: [id number]

Job #: [id number]

## 2018-03-22 NOTE — HHI.PR
Subjective


Remarks


F/U pleural effusion and anemia. Feels better no sxs ambulating. Dw IR, 

preferred to have pleurodesis when fluid reaccumulates which pt opted rather 

than having it done now. Dw Onc and Pulmo





Objective


Vitals





Vital Signs








  Date Time  Temp Pulse Resp B/P (MAP) Pulse Ox O2 Delivery O2 Flow Rate FiO2


 


3/22/18 08:00 97.5 97 16 121/71 (88) 93   


 


3/22/18 04:00 96.4 82 20 120/53 (75) 96   


 


3/22/18 03:26 96.4 82 20 120/53 96   


 


3/22/18 00:08 97.3 82 16 106/81 94   


 


3/22/18 00:00 97.5 88 22 119/57 (77) 94   


 


3/21/18 21:20        21


 


3/21/18 20:00 98.1 109 22 103/54 (70) 95   


 


3/21/18 16:00 97.1 85 16 112/57 (75) 94   


 


3/21/18 12:37 97.8 68 17 105/56 (72) 96   


 


3/21/18 12:34 98.0 66 22 105/56 (72) 94   


 


3/21/18 12:19 98.6 66 22 121/53 (75) 95   














I/O      


 


 3/21/18 3/21/18 3/21/18 3/22/18 3/22/18 3/22/18





 07:00 15:00 23:00 07:00 15:00 23:00


 


Intake Total   240 ml 690 ml  


 


Balance   240 ml 690 ml  


 


      


 


Intake Oral   240 ml 240 ml  


 


Packed Cells    400 ml  


 


Blood Product IV Normal Saline Flush    50 ml  


 


# Voids   0 3  


 


# Bowel Movements   0 0  








Result Diagram:  


3/22/18 0551                                                                   

             3/22/18 0551





Imaging





Last Impressions








Thoracentesis Ultrasound 3/21/18 0000 Signed





Impressions: 





 Service Date/Time:  Wednesday, March 21, 2018 11:29 - CONCLUSION: 

Uncomplicated 





 ultrasound guided thoracentesis.       Da Lao MD 


 


Chest X-Ray 3/21/18 0000 Signed





Impressions: 





 Service Date/Time:  Wednesday, March 21, 2018 12:27 - CONCLUSION:  Status post 





 left thoracentesis. No pneumothorax.     Bruno Raymundo MD 


 


Chest Ultrasound 3/20/18 0000 Signed





Impressions: 





 Service Date/Time:  Tuesday, March 20, 2018 18:54 - CONCLUSION:  1. Marked 





 placed over large left effusion with patient sitting up the side of the bed. 





 Estimated fluid volume 1780 mL.     Daren Hanks MD 








Objective Remarks


GENERAL: This is a well-nourished, well-developed patient, in no apparent 

distress.


SKIN: No rashes, ecchymoses or lesions. Cool and dry.


CARDIOVASCULAR: Regular rate and rhythm without gallops, or rubs.  Systolic 

murmur loudest at aortic area.


RESPIRATORY: Bilaterally decreased air entry.


GASTROINTESTINAL: Abdomen soft, non-tender, nondistended. No guarding.


MUSCULOSKELETAL: Extremities without clubbing, cyanosis, or edema. No calf 

tenderness. 


NEUROLOGICAL: Awake and alert. Motor and sensory grossly within normal limits. 

Normal speech.


Procedures


Ultrasound-guided thoracentesis





A/P


Problem List:  


(1) Pleural effusion


ICD Code:  J90 - Pleural effusion, not elsewhere classified


Status:  Acute


Assessment and Plan


Recurrent pleural effusion previous cytology negative in a patient with a 

history of progressive lung cancer.  She tolerated thoracentesis and feels much 

better without shortness of breath on RA.  Dw IR, preferred to have pleurodesis 

when fluid reaccumulates which pt opted rather than having it done now. Dw Onc 

and Pulmo


Symptomatic anemia.  Improved after blood transfusion.  She will continue to 

receive IV iron in the clinic outpatient follow-up


Heart murmur on examination.  Patient reports she is aware of this and follows 

up with Dr. Rivera.  She had routine echoes serially.  Her dyspnea was not 

secondary to valvular disease.


Mild leukopenia.  Monitor


Chronic medical conditions of htn, cad s/p MI 1987, Afib, RLE DVT- 1985, 

hypothyroidism, seizures and AVMs.  Continue outpatient medications 


DVT prophylaxis with ambulation/SCD.


GI prophylaxis on pantoprazole.


Discharge Planning


Discharge patient to home


Condition on discharge: Improved


Regular Diet as tolerated


Ad Jenn activity no driving


Rx written: Lortab


Follow-up with primary care physician, medical oncology, pulmonary and 

interventional radiology for pleurodesis











Domo Abraham MD Mar 22, 2018 10:46

## 2018-03-22 NOTE — HHI.DCPOC
Discharge Care Plan


Diagnosis:  


(1) Pleural effusion


(2) Lung cancer








Your Health Problems Are: Difficulty with ADL





 Exercise Tolerance








Goals to Promote Your Health


* To prevent worsening of your condition and complications


* To maintain your health at the optimal level


Directions to Meet Your Goals


*** Take your medications as prescribed


*** Follow your dietary instruction


*** Follow activity as directed








*** Keep your appointments as scheduled


*** Take your immunizations and boosters as scheduled


*** If your symptoms worsen call your PCP, if no PCP go to Urgent Care Center 

or Emergency Room***


*** Smoking is Dangerous to Your Health. Avoid second hand smoke***


***Call the 24-hour hour crisis hotline for domestic abuse at 1-671.880.9493***











Domo Abraham MD Mar 22, 2018 10:44

## 2018-03-22 NOTE — HHI.PR
Subjective


Remarks


78 YOWF with ca lung, SBRT, pl eff


Had TC done


Breathing better


Not enough fluid left for Chest tube





Objective


Vital Signs





Vital Signs








  Date Time  Temp Pulse Resp B/P (MAP) Pulse Ox O2 Delivery O2 Flow Rate FiO2


 


3/22/18 12:00 97.3 83 16 112/51 (71) 97   


 


3/22/18 08:00 97.5 97 16 121/71 (88) 93   


 


3/22/18 04:00 96.4 82 20 120/53 (75) 96   


 


3/22/18 03:26 96.4 82 20 120/53 96   


 


3/22/18 00:08 97.3 82 16 106/81 94   


 


3/22/18 00:00 97.5 88 22 119/57 (77) 94   


 


3/21/18 21:20        21


 


3/21/18 20:00 98.1 109 22 103/54 (70) 95   


 


3/21/18 16:00 97.1 85 16 112/57 (75) 94   














I/O      


 


 3/21/18 3/21/18 3/21/18 3/22/18 3/22/18 3/22/18





 07:00 15:00 23:00 07:00 15:00 23:00


 


Intake Total   240 ml 690 ml  


 


Balance   240 ml 690 ml  


 


      


 


Intake Oral   240 ml 240 ml  


 


Packed Cells    400 ml  


 


Blood Product IV Normal Saline Flush    50 ml  


 


# Voids   0 3  


 


# Bowel Movements   0 0  








Result Diagram:  


3/22/18 0551                                                                   

             3/22/18 0551





Objective Remarks


GENERAL: Thin built WF, NAD


SKIN: Warm and dry.


HEAD: Normocephalic.


EYES: No scleral icterus. No injection or drainage. 


NECK: Supple, trachea midline. No JVD or lymphadenopathy.


CARDIOVASCULAR: Regular rate and rhythm without murmurs, gallops, or rubs. 


RESPIRATORY: Breath sounds equal bilaterally. No accessory muscle use.


GASTROINTESTINAL: Abdomen soft, non-tender, nondistended. 


MUSCULOSKELETAL: No cyanosis, or edema. 


BACK: Nontender without obvious deformity. No CVA tenderness.








A/P


Assessment and Plan


IMPRESSION:





1.  Recurrent left pleural effusion, status post thoracentesis.


2.  Carcinoma of the lung., status post stereotactic body radiation 


therapy.


3.  Chronic obstructive pulmonary disease.


4.  Anemia.


5.  Coronary artery disease.


6.  Gastroesophageal reflux.


7.  History of brain aneurysm.








PLAN:





Will need chest tube and pleurodesis when pl fluid re occours


Stable on RA


DC plans for home


Will FU in office


DW Dr.Abando Sampson,Andrade Nolasco MD Mar 22, 2018 14:44

## 2018-06-05 ENCOUNTER — HOSPITAL ENCOUNTER (INPATIENT)
Dept: HOSPITAL 17 - NEPC | Age: 78
LOS: 7 days | Discharge: HOME HEALTH | DRG: 187 | End: 2018-06-12
Attending: HOSPITALIST | Admitting: HOSPITALIST
Payer: COMMERCIAL

## 2018-06-05 VITALS
OXYGEN SATURATION: 97 % | DIASTOLIC BLOOD PRESSURE: 57 MMHG | HEART RATE: 89 BPM | SYSTOLIC BLOOD PRESSURE: 111 MMHG | TEMPERATURE: 98.4 F | RESPIRATION RATE: 22 BRPM

## 2018-06-05 VITALS
RESPIRATION RATE: 18 BRPM | TEMPERATURE: 97.6 F | OXYGEN SATURATION: 99 % | DIASTOLIC BLOOD PRESSURE: 63 MMHG | HEART RATE: 75 BPM | SYSTOLIC BLOOD PRESSURE: 146 MMHG

## 2018-06-05 VITALS
RESPIRATION RATE: 17 BRPM | HEART RATE: 68 BPM | SYSTOLIC BLOOD PRESSURE: 133 MMHG | DIASTOLIC BLOOD PRESSURE: 63 MMHG | OXYGEN SATURATION: 95 %

## 2018-06-05 VITALS
SYSTOLIC BLOOD PRESSURE: 122 MMHG | RESPIRATION RATE: 20 BRPM | DIASTOLIC BLOOD PRESSURE: 58 MMHG | OXYGEN SATURATION: 95 % | HEART RATE: 80 BPM

## 2018-06-05 VITALS
OXYGEN SATURATION: 96 % | DIASTOLIC BLOOD PRESSURE: 59 MMHG | HEART RATE: 81 BPM | RESPIRATION RATE: 22 BRPM | SYSTOLIC BLOOD PRESSURE: 124 MMHG

## 2018-06-05 VITALS — OXYGEN SATURATION: 96 %

## 2018-06-05 DIAGNOSIS — I10: ICD-10-CM

## 2018-06-05 DIAGNOSIS — D63.0: ICD-10-CM

## 2018-06-05 DIAGNOSIS — Z86.718: ICD-10-CM

## 2018-06-05 DIAGNOSIS — I25.10: ICD-10-CM

## 2018-06-05 DIAGNOSIS — Q27.30: ICD-10-CM

## 2018-06-05 DIAGNOSIS — I73.9: ICD-10-CM

## 2018-06-05 DIAGNOSIS — G40.909: ICD-10-CM

## 2018-06-05 DIAGNOSIS — E78.00: ICD-10-CM

## 2018-06-05 DIAGNOSIS — F32.9: ICD-10-CM

## 2018-06-05 DIAGNOSIS — J44.9: ICD-10-CM

## 2018-06-05 DIAGNOSIS — H91.90: ICD-10-CM

## 2018-06-05 DIAGNOSIS — F41.9: ICD-10-CM

## 2018-06-05 DIAGNOSIS — K21.9: ICD-10-CM

## 2018-06-05 DIAGNOSIS — Z87.891: ICD-10-CM

## 2018-06-05 DIAGNOSIS — C34.12: ICD-10-CM

## 2018-06-05 DIAGNOSIS — K44.9: ICD-10-CM

## 2018-06-05 DIAGNOSIS — D50.9: ICD-10-CM

## 2018-06-05 DIAGNOSIS — E03.9: ICD-10-CM

## 2018-06-05 DIAGNOSIS — Z82.49: ICD-10-CM

## 2018-06-05 DIAGNOSIS — I48.91: ICD-10-CM

## 2018-06-05 DIAGNOSIS — I25.2: ICD-10-CM

## 2018-06-05 DIAGNOSIS — M19.90: ICD-10-CM

## 2018-06-05 DIAGNOSIS — Z79.82: ICD-10-CM

## 2018-06-05 DIAGNOSIS — Z92.3: ICD-10-CM

## 2018-06-05 DIAGNOSIS — R91.1: ICD-10-CM

## 2018-06-05 DIAGNOSIS — N28.9: ICD-10-CM

## 2018-06-05 DIAGNOSIS — J90: Primary | ICD-10-CM

## 2018-06-05 DIAGNOSIS — K59.00: ICD-10-CM

## 2018-06-05 LAB
ALBUMIN SERPL-MCNC: 3.5 GM/DL (ref 3.4–5)
ALP SERPL-CCNC: 165 U/L (ref 45–117)
ALT SERPL-CCNC: 29 U/L (ref 10–53)
AST SERPL-CCNC: 21 U/L (ref 15–37)
BASOPHILS # BLD AUTO: 0 TH/MM3 (ref 0–0.2)
BASOPHILS NFR BLD: 0.7 % (ref 0–2)
BILIRUB SERPL-MCNC: 0.2 MG/DL (ref 0.2–1)
BUN SERPL-MCNC: 16 MG/DL (ref 7–18)
CALCIUM SERPL-MCNC: 8.9 MG/DL (ref 8.5–10.1)
CHLORIDE SERPL-SCNC: 102 MEQ/L (ref 98–107)
CREAT SERPL-MCNC: 0.88 MG/DL (ref 0.5–1)
EOSINOPHIL # BLD: 0 TH/MM3 (ref 0–0.4)
EOSINOPHIL NFR BLD: 0.7 % (ref 0–4)
ERYTHROCYTE [DISTWIDTH] IN BLOOD BY AUTOMATED COUNT: 20 % (ref 11.6–17.2)
GFR SERPLBLD BASED ON 1.73 SQ M-ARVRAT: 62 ML/MIN (ref 89–?)
GLUCOSE SERPL-MCNC: 120 MG/DL (ref 74–106)
HCO3 BLD-SCNC: 26.6 MEQ/L (ref 21–32)
HCT VFR BLD CALC: 31.9 % (ref 35–46)
HGB BLD-MCNC: 10 GM/DL (ref 11.6–15.3)
INR PPP: 1.1 RATIO
LYMPHOCYTES # BLD AUTO: 0.7 TH/MM3 (ref 1–4.8)
LYMPHOCYTES NFR BLD AUTO: 16.8 % (ref 9–44)
MAGNESIUM SERPL-MCNC: 2.1 MG/DL (ref 1.5–2.5)
MCH RBC QN AUTO: 26.3 PG (ref 27–34)
MCHC RBC AUTO-ENTMCNC: 31.2 % (ref 32–36)
MCV RBC AUTO: 84.3 FL (ref 80–100)
MONOCYTE #: 0.4 TH/MM3 (ref 0–0.9)
MONOCYTES NFR BLD: 8.9 % (ref 0–8)
NEUTROPHILS # BLD AUTO: 3 TH/MM3 (ref 1.8–7.7)
NEUTROPHILS NFR BLD AUTO: 72.9 % (ref 16–70)
PLATELET # BLD: 188 TH/MM3 (ref 150–450)
PMV BLD AUTO: 7.3 FL (ref 7–11)
PROT SERPL-MCNC: 7.3 GM/DL (ref 6.4–8.2)
PROTHROMBIN TIME: 11.1 SEC (ref 9.8–11.6)
RBC # BLD AUTO: 3.78 MIL/MM3 (ref 4–5.3)
SODIUM SERPL-SCNC: 138 MEQ/L (ref 136–145)
WBC # BLD AUTO: 4.2 TH/MM3 (ref 4–11)

## 2018-06-05 PROCEDURE — 94640 AIRWAY INHALATION TREATMENT: CPT

## 2018-06-05 PROCEDURE — 87015 SPECIMEN INFECT AGNT CONCNTJ: CPT

## 2018-06-05 PROCEDURE — 71250 CT THORAX DX C-: CPT

## 2018-06-05 PROCEDURE — 88305 TISSUE EXAM BY PATHOLOGIST: CPT

## 2018-06-05 PROCEDURE — 85610 PROTHROMBIN TIME: CPT

## 2018-06-05 PROCEDURE — 83540 ASSAY OF IRON: CPT

## 2018-06-05 PROCEDURE — 71046 X-RAY EXAM CHEST 2 VIEWS: CPT

## 2018-06-05 PROCEDURE — 80048 BASIC METABOLIC PNL TOTAL CA: CPT

## 2018-06-05 PROCEDURE — 87102 FUNGUS ISOLATION CULTURE: CPT

## 2018-06-05 PROCEDURE — 94150 VITAL CAPACITY TEST: CPT

## 2018-06-05 PROCEDURE — 88112 CYTOPATH CELL ENHANCE TECH: CPT

## 2018-06-05 PROCEDURE — 87205 SMEAR GRAM STAIN: CPT

## 2018-06-05 PROCEDURE — 82728 ASSAY OF FERRITIN: CPT

## 2018-06-05 PROCEDURE — 80053 COMPREHEN METABOLIC PANEL: CPT

## 2018-06-05 PROCEDURE — 71045 X-RAY EXAM CHEST 1 VIEW: CPT

## 2018-06-05 PROCEDURE — 87116 MYCOBACTERIA CULTURE: CPT

## 2018-06-05 PROCEDURE — 82607 VITAMIN B-12: CPT

## 2018-06-05 PROCEDURE — 87070 CULTURE OTHR SPECIMN AEROBIC: CPT

## 2018-06-05 PROCEDURE — 85027 COMPLETE CBC AUTOMATED: CPT

## 2018-06-05 PROCEDURE — 94664 DEMO&/EVAL PT USE INHALER: CPT

## 2018-06-05 PROCEDURE — 87641 MR-STAPH DNA AMP PROBE: CPT

## 2018-06-05 PROCEDURE — 83550 IRON BINDING TEST: CPT

## 2018-06-05 PROCEDURE — 86850 RBC ANTIBODY SCREEN: CPT

## 2018-06-05 PROCEDURE — 81001 URINALYSIS AUTO W/SCOPE: CPT

## 2018-06-05 PROCEDURE — 87206 SMEAR FLUORESCENT/ACID STAI: CPT

## 2018-06-05 PROCEDURE — 83880 ASSAY OF NATRIURETIC PEPTIDE: CPT

## 2018-06-05 PROCEDURE — 86901 BLOOD TYPING SEROLOGIC RH(D): CPT

## 2018-06-05 PROCEDURE — 83735 ASSAY OF MAGNESIUM: CPT

## 2018-06-05 PROCEDURE — 82747 ASSAY OF FOLIC ACID RBC: CPT

## 2018-06-05 PROCEDURE — 85730 THROMBOPLASTIN TIME PARTIAL: CPT

## 2018-06-05 PROCEDURE — 85025 COMPLETE CBC W/AUTO DIFF WBC: CPT

## 2018-06-05 PROCEDURE — 86900 BLOOD TYPING SEROLOGIC ABO: CPT

## 2018-06-05 PROCEDURE — 93005 ELECTROCARDIOGRAM TRACING: CPT

## 2018-06-05 RX ADMIN — METOPROLOL TARTRATE SCH MG: 25 TABLET, FILM COATED ORAL at 23:20

## 2018-06-05 RX ADMIN — PHENYTOIN SODIUM SCH MG: 100 CAPSULE, EXTENDED RELEASE ORAL at 23:20

## 2018-06-05 RX ADMIN — PANTOPRAZOLE SCH MG: 40 TABLET, DELAYED RELEASE ORAL at 23:20

## 2018-06-05 RX ADMIN — PHENYTOIN SODIUM SCH MLS/HR: 50 INJECTION INTRAMUSCULAR; INTRAVENOUS at 23:20

## 2018-06-05 RX ADMIN — DILTIAZEM HYDROCHLORIDE SCH MG: 240 CAPSULE, EXTENDED RELEASE ORAL at 23:20

## 2018-06-05 RX ADMIN — ATORVASTATIN CALCIUM SCH MG: 80 TABLET, FILM COATED ORAL at 23:20

## 2018-06-05 NOTE — PD
HPI


Chief Complaint:  Respiratory Symptoms


Time Seen by Provider:  16:20


Travel History


International Travel<30 days:  No


Contact w/Intl Traveler<30days:  No


Traveled to known affect area:  No





History of Present Illness


HPI


Patient is a 78 year old female who with history of progressive lung cancer, 

was sent to the ER by her oncologist, Dr. Calzada, for admission to the hospital. 

Reports that she gets recurrent build up of fluid in her lungs requiring a 

thoracentesis. Her last thoracentesis was in January - patient reported that 

the fluid was cancer free at that time.  Reports that she has been very short 

of breath over the past few months - symptoms are getting progressively worse.  

Patient was told to go to the ER for admission to the hospital for 

thoracentesis and possibly another procedure that would help with the build up 

of fluid in her lungs.  Patient with no chest pain, no fever/chills, no other 

complaints at this time.





PFSH


Past Medical History


Arthritis:  Yes


Atrial Fibrillation:  Yes


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


Anxiety:  Yes


Depression:  Yes


Heart Rhythm Problems:  Yes (A Fib w/RVR)


Cancer:  Yes (Lung)


Cardiac Catheterization:  Yes


Cardiovascular Problems:  Yes


High Cholesterol:  Yes


Chemotherapy:  Yes


Chest Pain:  Yes


Congestive Heart Failure:  No


COPD:  Yes


Coronary Artery Disease:  Yes


Diabetes:  No


Diminished Hearing:  Yes


Endocrine:  Yes


Gastrointestinal Disorders:  Yes (AVM STOMACH)


GERD:  Yes


Hepatitis:  No


Hiatal Hernia:  Yes (REFLUX)


Heparin Induced Thrombocytopen:  No


Hypertension:  Yes


Immune Disorder:  No


Implanted Vascular Access Dvce:  No


Musculoskeletal:  Yes


Neurologic:  Yes (brain aneurysm repair)


Psychiatric:  Yes


Reproductive:  No


Respiratory:  Yes


Myocardial Infarction:  Yes ()


Radiation Therapy:  Yes


Seizures:  Yes


Thyroid Disease:  Yes (hypo)


Pregnant?:  Not Pregnant


Menopausal:  Yes


:  4


Para:  3


Miscarriage:  1


Tubal Ligation:  Yes





Past Surgical History


Abdominal Surgery:  Yes (STENTS PLACED TO STOMACH AS STATED  bypass R/T PVD)


Body Medical Devices:  stents in abdominal aorta x 2


Cardiac Surgery:  Yes (ANGIO-PLASTY)


Coronary Artery Bypass Graft:  No


Ear Surgery:  No


Endocrine Surgery:  No


Eye Surgery:  No


Genitourinary Surgery:  No


Gynecologic Surgery:  No


Joint Replacement:  No


Neurologic Surgery:  Yes (BRAIN ANEURYSM REPAIR )


Oral Surgery:  No


Pacemaker:  No


Thoracic Surgery:  No


Tonsillectomy:  Yes


Other Surgery:  Yes (throat, brain aneurysm )





Family History


Family Myocardial Infarction:  Yes





Social History


Alcohol Use:  No


Tobacco Use:  No


Substance Use:  No





Allergies-Medications


(Allergen,Severity, Reaction):  


Coded Allergies:  


     No Known Allergies (Verified  Allergy, Unknown, 10/30/17)


Reported Meds & Prescriptions





Reported Meds & Active Scripts


Active


Digoxin 0.125 Mg Tab 0.125 Mg PO DAILY


Aspirin Low Strength (Aspirin) 81 Mg Chew 81 Mg CHEW DAILY


Protonix (Pantoprazole Sodium) 40 Mg Tab 40 Mg PO BID 30 Days


Reported


Amiodarone (Amiodarone HCl) 200 Mg Tab 200 Mg PO DAILY


Cardizem LA (Diltiazem ER 24 HR) 240 Mg Analisa 240 Mg PO HS


Lopressor (Metoprolol Tartrate) 50 Mg Tab 25 Mg PO BID


Vitamin D-3 (Cholecalciferol) 1,000 Unit Cap 1,000 Tab PO DAILY


Multi For Her (Multiple Vitamins W/ Minerals) 1 Tab Tab 1 Tab PO DAILY


Dilantin (Phenytoin Extended) 100 Mg Cap 400 Mg PO HS


Lipitor (Atorvastatin Calcium) 80 Mg Tab 80 Mg PO HS


Zetia (Ezetimibe) 10 Mg Tab 10 Mg PO DAILY


Levothyroxine (Levothyroxine Sodium) 150 Mcg Tab 150 Mcg PO DAILY








Review of Systems


General / Constitutional:  No: Fever


Eyes:  No: Visual changes


HENT:  No: Headaches


Cardiovascular:  No: Chest Pain or Discomfort


Respiratory:  Positive: Shortness of Breath


Gastrointestinal:  No: Abdominal Pain


Genitourinary:  No: Dysuria


Musculoskeletal:  No: Pain


Skin:  No Rash


Neurologic:  No: Weakness


Psychiatric:  No: Depression


Endocrine:  No: Polydipsia


Hematologic/Lymphatic:  No: Easy Bruising





Physical Exam


Narrative


GENERAL: NAD


SKIN: Focused skin assessment warm/dry.


HEAD: Atraumatic. Normocephalic. 


EYES: Pupils equal and round. No scleral icterus. No injection or drainage. 


ENT: No nasal bleeding or discharge.  Mucous membranes pink and moist.


NECK: Trachea midline. No JVD. 


CARDIOVASCULAR: Regular rate and rhythm.  No murmur appreciated.


RESPIRATORY: No accessory muscle use. Clear to auscultation. Breath sounds 

equal bilaterally. 


GASTROINTESTINAL: Abdomen soft, non-tender, nondistended. Hepatic and splenic 

margins not palpable. 


MUSCULOSKELETAL: No obvious deformities. No clubbing.  No cyanosis.  No edema. 


NEUROLOGICAL: Awake and alert. No obvious cranial nerve deficits.  Motor 

grossly within normal limits. Normal speech.


PSYCHIATRIC: Appropriate mood and affect; insight and judgment normal.





Data


Data


Last Documented VS





Vital Signs








  Date Time  Temp Pulse Resp B/P (MAP) Pulse Ox O2 Delivery O2 Flow Rate FiO2


 


18 16:43     96 Room Air  


 


18 16:39  80 20     


 


18 16:13 98.4       








Orders





 Orders


Complete Blood Count With Diff (18 16:37)


Comprehensive Metabolic Panel (18 16:37)


B-Type Natriuretic Peptide (18 16:37)


Act Partial Throm Time (Ptt) (18 16:37)


Prothrombin Time / Inr (Pt) (18 16:37)


Magnesium (Mg) (18 16:37)


Iv Access Insert/Monitor (18 16:37)


Ecg Monitoring (18 16:37)


Oximetry (18 16:37)


Sodium Chloride 0.9% Flush (Ns Flush) (18 16:45)


Chest, Pa & Lat (18 16:37)





Labs





Laboratory Tests








Test


  18


16:45


 


White Blood Count 4.2 TH/MM3 


 


Red Blood Count 3.78 MIL/MM3 


 


Hemoglobin 10.0 GM/DL 


 


Hematocrit 31.9 % 


 


Mean Corpuscular Volume 84.3 FL 


 


Mean Corpuscular Hemoglobin 26.3 PG 


 


Mean Corpuscular Hemoglobin


Concent 31.2 % 


 


 


Red Cell Distribution Width 20.0 % 


 


Platelet Count 188 TH/MM3 


 


Mean Platelet Volume 7.3 FL 


 


Neutrophils (%) (Auto) 72.9 % 


 


Lymphocytes (%) (Auto) 16.8 % 


 


Monocytes (%) (Auto) 8.9 % 


 


Eosinophils (%) (Auto) 0.7 % 


 


Basophils (%) (Auto) 0.7 % 


 


Neutrophils # (Auto) 3.0 TH/MM3 


 


Lymphocytes # (Auto) 0.7 TH/MM3 


 


Monocytes # (Auto) 0.4 TH/MM3 


 


Eosinophils # (Auto) 0.0 TH/MM3 


 


Basophils # (Auto) 0.0 TH/MM3 


 


CBC Comment DIFF FINAL 


 


Differential Comment  


 


Prothrombin Time 11.1 SEC 


 


Prothromb Time International


Ratio 1.1 RATIO 


 


 


Activated Partial


Thromboplast Time 25.4 SEC 


 


 


Blood Urea Nitrogen 16 MG/DL 


 


Creatinine 0.88 MG/DL 


 


Random Glucose 120 MG/DL 


 


Total Protein 7.3 GM/DL 


 


Albumin 3.5 GM/DL 


 


Calcium Level 8.9 MG/DL 


 


Magnesium Level 2.1 MG/DL 


 


Alkaline Phosphatase 165 U/L 


 


Aspartate Amino Transf


(AST/SGOT) 21 U/L 


 


 


Alanine Aminotransferase


(ALT/SGPT) 29 U/L 


 


 


Total Bilirubin 0.2 MG/DL 


 


Sodium Level 138 MEQ/L 


 


Potassium Level 4.3 MEQ/L 


 


Chloride Level 102 MEQ/L 


 


Carbon Dioxide Level 26.6 MEQ/L 


 


Anion Gap 9 MEQ/L 


 


Estimat Glomerular Filtration


Rate 62 ML/MIN 


 











MDM


Medical Decision Making


Medical Screen Exam Complete:  Yes


Emergency Medical Condition:  Yes


Medical Record Reviewed:  Yes


Interpretation(s)





Vital Signs








  Date Time  Temp Pulse Resp B/P (MAP) Pulse Ox O2 Delivery O2 Flow Rate FiO2


 


18 16:43     96 Room Air  


 


18 16:39  80 20 122/58 (79) 95 Room Air  


 


18 16:29     96 Room Air  


 


18 16:13 98.4 89 22 111/57 (75) 97   








Differential Diagnosis


pleural effusion, pneumothorax, acs, arrhythmia


Narrative Course


During the course of the patients emergency department visit, the patients 

history, examination, and differential diagnosis were reviewed with the 

patient. The patient was placed on a cardiac monitor with oximetry and frequent 

blood pressure monitoring. The patient had an IV access obtained and blood work 

sent for analysis.





The patients laboratory studies were reviewed and remarkable for 








Laboratory Tests








Test


  18


16:45


 


White Blood Count


  4.2 TH/MM3


(4.0-11.0)


 


Red Blood Count


  3.78 MIL/MM3


(4.00-5.30)


 


Hemoglobin


  10.0 GM/DL


(11.6-15.3)


 


Hematocrit


  31.9 %


(35.0-46.0)


 


Mean Corpuscular Volume


  84.3 FL


(80.0-100.0)


 


Mean Corpuscular Hemoglobin


  26.3 PG


(27.0-34.0)


 


Mean Corpuscular Hemoglobin


Concent 31.2 %


(32.0-36.0)


 


Red Cell Distribution Width


  20.0 %


(11.6-17.2)


 


Platelet Count


  188 TH/MM3


(150-450)


 


Mean Platelet Volume


  7.3 FL


(7.0-11.0)


 


Neutrophils (%) (Auto)


  72.9 %


(16.0-70.0)


 


Lymphocytes (%) (Auto)


  16.8 %


(9.0-44.0)


 


Monocytes (%) (Auto)


  8.9 %


(0.0-8.0)


 


Eosinophils (%) (Auto)


  0.7 %


(0.0-4.0)


 


Basophils (%) (Auto)


  0.7 %


(0.0-2.0)


 


Neutrophils # (Auto)


  3.0 TH/MM3


(1.8-7.7)


 


Lymphocytes # (Auto)


  0.7 TH/MM3


(1.0-4.8)


 


Monocytes # (Auto)


  0.4 TH/MM3


(0-0.9)


 


Eosinophils # (Auto)


  0.0 TH/MM3


(0-0.4)


 


Basophils # (Auto)


  0.0 TH/MM3


(0-0.2)


 


CBC Comment DIFF FINAL 


 


Differential Comment  


 


Prothrombin Time


  11.1 SEC


(9.8-11.6)


 


Prothromb Time International


Ratio 1.1 RATIO 


 


 


Activated Partial


Thromboplast Time 25.4 SEC


(24.3-30.1)


 


Blood Urea Nitrogen


  16 MG/DL


(7-18)


 


Creatinine


  0.88 MG/DL


(0.50-1.00)


 


Random Glucose


  120 MG/DL


()


 


Total Protein


  7.3 GM/DL


(6.4-8.2)


 


Albumin


  3.5 GM/DL


(3.4-5.0)


 


Calcium Level


  8.9 MG/DL


(8.5-10.1)


 


Magnesium Level


  2.1 MG/DL


(1.5-2.5)


 


Alkaline Phosphatase


  165 U/L


()


 


Aspartate Amino Transf


(AST/SGOT) 21 U/L (15-37) 


 


 


Alanine Aminotransferase


(ALT/SGPT) 29 U/L (10-53) 


 


 


Total Bilirubin


  0.2 MG/DL


(0.2-1.0)


 


Sodium Level


  138 MEQ/L


(136-145)


 


Potassium Level


  4.3 MEQ/L


(3.5-5.1)


 


Chloride Level


  102 MEQ/L


()


 


Carbon Dioxide Level


  26.6 MEQ/L


(21.0-32.0)


 


Anion Gap 9 MEQ/L (5-15) 


 


Estimat Glomerular Filtration


Rate 62 ML/MIN


(>89)





 





Radiology studies were reviewed and remarkable for 








Last Impressions








Chest X-Ray 18 1637 Signed





Impressions: 





 CONCLUSION: 





 Persistently elevated left hemidiaphragm with a pleural effusion on the left ar





 e unchanged. Some adjacent consolidation as well





  





 








Plan to admit patient to the hospital for further workup of her pleural 

effusions, there is considerations for a talc pleurodesis as she has history of 

recurrent pleural effusions, her oncologist will be consulted





case reviewed with Dr. Gauthier who accepts pt to service





Diagnosis





 Primary Impression:  


 Pleural effusion





Admitting Information


Admitting Physician Requests:  Admit











Marisol Hannah DO 2018 17:05

## 2018-06-05 NOTE — HHI.HP
__________________________________________________





HPI


Service


[facility]


Primary Care Physician


Shayan Lyles MD


Admission Diagnosis





Pleural effusion


Diagnoses:  


Travel History


International Travel<30 Days:  No


Contact w/Intl Traveler <30 Da:  No


Traveled to Known Affected Are:  No


History of Present Illness


78-year-old female with poorly differentiated adenocarcinoma of the lung and 

recurrent pleural effusions presents to the emergency department for the 

evaluation of increasing shortness of breath.  The patient has a past medical 

history significant for COPD, CAD, seizure disorder, hypertension, 

hypothyroidism, history of DVT, atrial fibrillation (not on anticoagulation 

secondary to GI bleeding), peripheral vascular disease and iron deficiency 

anemia.  She has completed S/P RT treatments to the lung mass and has declined 

any further treatment at this time.  The patient reports increasing shortness 

of breath since last week, worse than baseline.  The patient called her 

oncologist office who recommended going to the emergency department for further 

evaluation with possible admission for pleurodesis.  The patient denies any 

chest pain.  No abdominal pain.  No nausea/vomiting/diarrhea.  No lateralizing 

signs/symptoms.  No fever/chills.





Review of Systems


Except as stated in HPI:  all other systems reviewed are Neg





Past Family Social History


Past Medical History


Poorly differentiated adenocarcinoma of the lung, recurrent pleural effusions, 

COPD, CAD, seizure disorder, hypertension, hypothyroidism, history of DVT, 

atrial fibrillation (not on anticoagulation secondary to GI bleeding), 

peripheral vascular disease and iron deficiency anemia


Past Surgical History


Iliac stents 2


Gastric stent 1


Brain aneurysm clipping


Tonsillectomy


Tubal ligation


Reported Medications





Reported Meds & Active Scripts


Active


Digoxin 0.125 Mg Tab 0.125 Mg PO DAILY


Aspirin Low Strength (Aspirin) 81 Mg Chew 81 Mg CHEW DAILY


Protonix (Pantoprazole Sodium) 40 Mg Tab 40 Mg PO BID 30 Days


Reported


Amiodarone (Amiodarone HCl) 200 Mg Tab 200 Mg PO DAILY


Cardizem LA (Diltiazem ER 24 HR) 240 Mg Analisa 240 Mg PO HS


Lopressor (Metoprolol Tartrate) 50 Mg Tab 25 Mg PO BID


Vitamin D-3 (Cholecalciferol) 1,000 Unit Cap 1,000 Tab PO DAILY


Multi For Her (Multiple Vitamins W/ Minerals) 1 Tab Tab 1 Tab PO DAILY


Dilantin (Phenytoin Extended) 100 Mg Cap 400 Mg PO HS


Lipitor (Atorvastatin Calcium) 80 Mg Tab 80 Mg PO HS


Zetia (Ezetimibe) 10 Mg Tab 10 Mg PO DAILY


Levothyroxine (Levothyroxine Sodium) 150 Mcg Tab 150 Mcg PO DAILY


Allergies:  


Coded Allergies:  


     No Known Allergies (Verified  Allergy, Unknown, 10/30/17)


Family History


Negative for CAD/DM


Social History


Former smoker.  Occasional alcohol.  Denies illicit drugs.





Physical Exam


Vital Signs





Vital Signs








  Date Time  Temp Pulse Resp B/P (MAP) Pulse Ox O2 Delivery O2 Flow Rate FiO2


 


18 19:15  68 17 133/63 (86) 95 Room Air  


 


18 18:31  81 22 124/59 (80) 96 Room Air  


 


18 16:43     96 Room Air  


 


18 16:39  80 20 122/58 (79) 95 Room Air  


 


18 16:29     96 Room Air  


 


18 16:13 98.4 89 22 111/57 (75) 97   








Physical Exam


GENERAL:  female sitting up in bed


SKIN: No rashes, ecchymoses or lesions. Cool and dry.


HEAD: Atraumatic. Normocephalic. No temporal or scalp tenderness.


EYES: Pupils equal round and reactive. Extraocular motions intact. No scleral 

icterus. No injection or drainage. 


ENT: Nose without bleeding, purulent drainage or septal hematoma. Throat 

without erythema, tonsillar hypertrophy or exudate. Uvula midline. Airway 

patent.


NECK: Trachea midline. No JVD or lymphadenopathy. Supple, nontender, no 

meningeal signs.


CARDIOVASCULAR: Regular rate and rhythm without murmurs, gallops, or rubs. 


RESPIRATORY: Crackles left lung base.  No wheezes.


GASTROINTESTINAL: Abdomen soft, non-tender, nondistended. No hepato-splenomegaly

, or palpable masses. No guarding.


MUSCULOSKELETAL: Extremities without clubbing, cyanosis, or edema. No joint 

tenderness, effusion, or edema noted. No calf tenderness. 


NEUROLOGICAL: Awake and alert. Cranial nerves II through XII intact.  Motor and 

sensory grossly within normal limits. Normal speech.


Laboratory





Laboratory Tests








Test


  18


16:45


 


White Blood Count 4.2 


 


Red Blood Count 3.78 


 


Hemoglobin 10.0 


 


Hematocrit 31.9 


 


Mean Corpuscular Volume 84.3 


 


Mean Corpuscular Hemoglobin 26.3 


 


Mean Corpuscular Hemoglobin


Concent 31.2 


 


 


Red Cell Distribution Width 20.0 


 


Platelet Count 188 


 


Mean Platelet Volume 7.3 


 


Neutrophils (%) (Auto) 72.9 


 


Lymphocytes (%) (Auto) 16.8 


 


Monocytes (%) (Auto) 8.9 


 


Eosinophils (%) (Auto) 0.7 


 


Basophils (%) (Auto) 0.7 


 


Neutrophils # (Auto) 3.0 


 


Lymphocytes # (Auto) 0.7 


 


Monocytes # (Auto) 0.4 


 


Eosinophils # (Auto) 0.0 


 


Basophils # (Auto) 0.0 


 


CBC Comment DIFF FINAL 


 


Differential Comment  


 


Prothrombin Time 11.1 


 


Prothromb Time International


Ratio 1.1 


 


 


Activated Partial


Thromboplast Time 25.4 


 


 


Blood Urea Nitrogen 16 


 


Creatinine 0.88 


 


Random Glucose 120 


 


Total Protein 7.3 


 


Albumin 3.5 


 


Calcium Level 8.9 


 


Magnesium Level 2.1 


 


Alkaline Phosphatase 165 


 


Aspartate Amino Transf


(AST/SGOT) 21 


 


 


Alanine Aminotransferase


(ALT/SGPT) 29 


 


 


Total Bilirubin 0.2 


 


Sodium Level 138 


 


Potassium Level 4.3 


 


Chloride Level 102 


 


Carbon Dioxide Level 26.6 


 


Anion Gap 9 


 


Estimat Glomerular Filtration


Rate 62 


 


 


B-Type Natriuretic Peptide 267 








Result Diagram:  


18 1645                                                                    

            18 1645








Caprini VTE Risk Assessment


Caprini VTE Risk Assessment:  Mod/High Risk (score >= 2)


Caprini Risk Assessment Model











 Point Value = 1          Point Value = 2  Point Value = 3  Point Value = 5


 


Age 41-60


Minor surgery


BMI > 25 kg/m2


Swollen legs


Varicose veins


Pregnancy or postpartum


History of unexplained or recurrent


   spontaneous 


Oral contraceptives or hormone


   replacement


Sepsis (< 1 month)


Serious lung disease, including


   pneumonia (< 1 month)


Abnormal pulmonary function


Acute myocardial infarction


Congestive heart failure (< 1 month)


History of inflammatory bowel disease


Medical patient at bed rest Age 61-74


Arthroscopic surgery


Major open surgery (> 45 min)


Laparoscopic surgery (> 45 min)


Malignancy


Confined to bed (> 72 hours)


Immobilizing plaster cast


Central venous access Age >= 75


History of VTE


Family history of VTE


Factor V Leiden


Prothrombin 65019U


Lupus anticoagulant


Anticardiolipin antibodies


Elevated serum homocysteine


Heparin-induced thrombocytopenia


Other congenital or acquired


   thrombophilia Stroke (< 1 month)


Elective arthroplasty


Hip, pelvis, or leg fracture


Acute spinal cord injury (< 1 month)








Prophylaxis Regimen











   Total Risk


Factor Score Risk Level Prophylaxis Regimen


 


0-1      Low Early ambulation


 


2 Moderate Order ONE of the following:


*Sequential Compression Device (SCD)


*Heparin 5000 units SQ BID


 


3-4 Higher Order ONE of the following medications:


*Heparin 5000 units SQ TID


*Enoxaparin/Lovenox 40 mg SQ daily (WT < 150 kg, CrCl > 30 mL/min)


*Enoxaparin/Lovenox 30 mg SQ daily (WT < 150 kg, CrCl > 10-29 mL/min)


*Enoxaparin/Lovenox 30 mg SQ BID (WT < 150 kg, CrCl > 30 mL/min)


AND/OR


*Sequential Compression Device (SCD)


 


5 or more Highest Order ONE of the following medications:


*Heparin 5000 units SQ TID (Preferred with Epidurals)


*Enoxaparin/Lovenox 40 mg SQ daily (WT < 150 kg, CrCl > 30 mL/min)


*Enoxaparin/Lovenox 30 mg SQ daily (WT < 150 kg, CrCl > 10-29 mL/min)


*Enoxaparin/Lovenox 30 mg SQ BID (WT < 150 kg, CrCl > 30 mL/min)


AND


*Sequential Compression Device (SCD)











Assessment and Plan


Assessment and Plan


Assessment/plan:





1.  Poorly differentiated adenocarcinoma of the lung with recurrent pleural 

effusion


Chest x-ray significant for pleural effusion on the left that appears unchanged

, personally reviewed


Cardiothoracic surgery consulted for possible pleurodesis


Medical oncology consulted, appreciate recommendations


Patient may benefit from hospice consult





2.  Atrial fibrillation


Patient not on anticoagulation secondary to history of GI bleed


Continue home medications





3.  Seizure disorder


Continue home Dilantin





4.  Hypertension/CAD/hypothyroidism/peripheral vascular disease


Holding home aspirin for possible procedure


Continue home medications





FEN


N.p.o.


NS at 60 cc/hour


Electrolytes: Monitor and replete as needed


Holding pharmacologic anticoagulation for possible procedure





Physician Certification


2 Midnight Certification Type:  Admission for Inpatient Services


Order for Inpatient Services


The services are ordered in accordance with Medicare regulations or non-

Medicare payer requirements, as applicable.  In the case of services not 

specified as inpatient-only, they are appropriately provided as inpatient 

services in accordance with the 2-midnight benchmark.


Estimated LOS (days):  2


2 days is the estimated time the patient will need to remain in the hospital, 

assuming treatment plan goals are met and no additional complications.


Post-Hospital Plan:  Not yet determined











Marisol Gallo MD 2018 22:22

## 2018-06-06 VITALS
OXYGEN SATURATION: 98 % | RESPIRATION RATE: 18 BRPM | DIASTOLIC BLOOD PRESSURE: 62 MMHG | SYSTOLIC BLOOD PRESSURE: 114 MMHG | HEART RATE: 80 BPM | TEMPERATURE: 97.4 F

## 2018-06-06 VITALS
RESPIRATION RATE: 18 BRPM | HEART RATE: 63 BPM | OXYGEN SATURATION: 98 % | SYSTOLIC BLOOD PRESSURE: 124 MMHG | DIASTOLIC BLOOD PRESSURE: 68 MMHG | TEMPERATURE: 97.5 F

## 2018-06-06 VITALS
SYSTOLIC BLOOD PRESSURE: 156 MMHG | DIASTOLIC BLOOD PRESSURE: 66 MMHG | HEART RATE: 68 BPM | TEMPERATURE: 98 F | RESPIRATION RATE: 18 BRPM | OXYGEN SATURATION: 94 %

## 2018-06-06 VITALS
SYSTOLIC BLOOD PRESSURE: 144 MMHG | DIASTOLIC BLOOD PRESSURE: 86 MMHG | TEMPERATURE: 98 F | OXYGEN SATURATION: 100 % | RESPIRATION RATE: 18 BRPM | HEART RATE: 60 BPM

## 2018-06-06 VITALS
RESPIRATION RATE: 16 BRPM | TEMPERATURE: 97.6 F | HEART RATE: 78 BPM | OXYGEN SATURATION: 95 % | DIASTOLIC BLOOD PRESSURE: 65 MMHG | SYSTOLIC BLOOD PRESSURE: 155 MMHG

## 2018-06-06 VITALS — OXYGEN SATURATION: 96 %

## 2018-06-06 VITALS
OXYGEN SATURATION: 96 % | DIASTOLIC BLOOD PRESSURE: 58 MMHG | SYSTOLIC BLOOD PRESSURE: 115 MMHG | HEART RATE: 58 BPM | RESPIRATION RATE: 18 BRPM | TEMPERATURE: 98.4 F

## 2018-06-06 VITALS — OXYGEN SATURATION: 97 %

## 2018-06-06 VITALS — OXYGEN SATURATION: 98 %

## 2018-06-06 LAB
AMORPHOUS SEDIMENT, URINE: (no result)
BACTERIA #/AREA URNS HPF: (no result) /HPF
BASOPHILS # BLD AUTO: 0 TH/MM3 (ref 0–0.2)
BASOPHILS NFR BLD: 0.7 % (ref 0–2)
BUN SERPL-MCNC: 11 MG/DL (ref 7–18)
CALCIUM SERPL-MCNC: 8.8 MG/DL (ref 8.5–10.1)
CHLORIDE SERPL-SCNC: 105 MEQ/L (ref 98–107)
COLOR UR: YELLOW
CREAT SERPL-MCNC: 0.7 MG/DL (ref 0.5–1)
EOSINOPHIL # BLD: 0 TH/MM3 (ref 0–0.4)
EOSINOPHIL NFR BLD: 0.7 % (ref 0–4)
ERYTHROCYTE [DISTWIDTH] IN BLOOD BY AUTOMATED COUNT: 20.4 % (ref 11.6–17.2)
GFR SERPLBLD BASED ON 1.73 SQ M-ARVRAT: 81 ML/MIN (ref 89–?)
GLUCOSE SERPL-MCNC: 86 MG/DL (ref 74–106)
GLUCOSE UR STRIP-MCNC: (no result) MG/DL
HCO3 BLD-SCNC: 23.8 MEQ/L (ref 21–32)
HCT VFR BLD CALC: 31.7 % (ref 35–46)
HGB BLD-MCNC: 9.7 GM/DL (ref 11.6–15.3)
HGB UR QL STRIP: (no result)
KETONES UR STRIP-MCNC: (no result) MG/DL
LYMPHOCYTES # BLD AUTO: 0.5 TH/MM3 (ref 1–4.8)
LYMPHOCYTES NFR BLD AUTO: 14.2 % (ref 9–44)
MCH RBC QN AUTO: 26 PG (ref 27–34)
MCHC RBC AUTO-ENTMCNC: 30.5 % (ref 32–36)
MCV RBC AUTO: 85.2 FL (ref 80–100)
MONOCYTE #: 0.4 TH/MM3 (ref 0–0.9)
MONOCYTES NFR BLD: 11.1 % (ref 0–8)
NEUTROPHILS # BLD AUTO: 2.4 TH/MM3 (ref 1.8–7.7)
NEUTROPHILS NFR BLD AUTO: 73.3 % (ref 16–70)
NITRITE UR QL STRIP: (no result)
PLATELET # BLD: 170 TH/MM3 (ref 150–450)
PMV BLD AUTO: 7.3 FL (ref 7–11)
RBC # BLD AUTO: 3.71 MIL/MM3 (ref 4–5.3)
SODIUM SERPL-SCNC: 138 MEQ/L (ref 136–145)
SP GR UR STRIP: 1.01 (ref 1–1.03)
SQUAMOUS #/AREA URNS HPF: 2 /HPF (ref 0–5)
URINE LEUKOCYTE ESTERASE: (no result)
WBC # BLD AUTO: 3.3 TH/MM3 (ref 4–11)

## 2018-06-06 RX ADMIN — Medication SCH ML: at 08:40

## 2018-06-06 RX ADMIN — LEVOTHYROXINE SODIUM SCH MCG: 150 TABLET ORAL at 06:05

## 2018-06-06 RX ADMIN — PHENYTOIN SODIUM SCH MG: 100 CAPSULE, EXTENDED RELEASE ORAL at 21:03

## 2018-06-06 RX ADMIN — STANDARDIZED SENNA CONCENTRATE AND DOCUSATE SODIUM SCH TAB: 8.6; 5 TABLET, FILM COATED ORAL at 21:00

## 2018-06-06 RX ADMIN — PANTOPRAZOLE SCH MG: 40 TABLET, DELAYED RELEASE ORAL at 08:36

## 2018-06-06 RX ADMIN — METOPROLOL TARTRATE SCH MG: 25 TABLET, FILM COATED ORAL at 21:03

## 2018-06-06 RX ADMIN — AMIODARONE HYDROCHLORIDE SCH MG: 200 TABLET ORAL at 08:37

## 2018-06-06 RX ADMIN — METOPROLOL TARTRATE SCH MG: 25 TABLET, FILM COATED ORAL at 08:37

## 2018-06-06 RX ADMIN — DILTIAZEM HYDROCHLORIDE SCH MG: 240 CAPSULE, EXTENDED RELEASE ORAL at 21:03

## 2018-06-06 RX ADMIN — DIGOXIN SCH MG: 125 TABLET ORAL at 09:00

## 2018-06-06 RX ADMIN — STANDARDIZED SENNA CONCENTRATE AND DOCUSATE SODIUM SCH TAB: 8.6; 5 TABLET, FILM COATED ORAL at 08:38

## 2018-06-06 RX ADMIN — METOPROLOL TARTRATE SCH MG: 25 TABLET, FILM COATED ORAL at 09:00

## 2018-06-06 RX ADMIN — EZETIMIBE SCH MG: 10 TABLET ORAL at 08:37

## 2018-06-06 RX ADMIN — IPRATROPIUM BROMIDE AND ALBUTEROL SULFATE PRN AMPULE: .5; 3 SOLUTION RESPIRATORY (INHALATION) at 08:57

## 2018-06-06 RX ADMIN — PANTOPRAZOLE SCH MG: 40 TABLET, DELAYED RELEASE ORAL at 21:03

## 2018-06-06 RX ADMIN — ATORVASTATIN CALCIUM SCH MG: 80 TABLET, FILM COATED ORAL at 21:03

## 2018-06-06 RX ADMIN — Medication SCH ML: at 21:00

## 2018-06-06 RX ADMIN — PHENYTOIN SODIUM SCH MLS/HR: 50 INJECTION INTRAMUSCULAR; INTRAVENOUS at 21:02

## 2018-06-06 RX ADMIN — IPRATROPIUM BROMIDE AND ALBUTEROL SULFATE PRN AMPULE: .5; 3 SOLUTION RESPIRATORY (INHALATION) at 03:03

## 2018-06-06 NOTE — MB
cc:

Terwilliger,Jacqueline R ARNP

****

 

 

DATE:

06/06/2018

 

HISTORY OF PRESENT ILLNESS:

A 78-year-old female with history of poorly differentiated 

adenocarcinoma of the lung.  She has completed radiation therapy to 

the lung mass.  Her last one was in February.  She is followed by Dr. Da Lyles, Dr. Familia Calzada.  Per the oncology note, she recently 

underwent a PET scan.  The left upper lobe was slightly larger.  There

is a small nodule in the right lower lobe, which was also slightly 

larger.  She has had recurrent pleural effusions in the left lung and 

has undergone multiple thoracenteses.  Per the note she has preferred 

not to be treated with systemic therapy.  She had a recent chest x-ray

that showed about 250 mL in the left lung; however, she came in to the

emergency room with increasing shortness of breath, recurrent left 

pleural effusion.  CT chest shows large left pleural effusion with 

consolidation or compressive collapse.  She also still has a 2.4 cm 

spiculated mass in the left upper lobe.  The left upper lobe malignant

mass appears larger than from 01/2018.  She has a right lower lobe 

nodule.  She has a significant dense vascular calcification of the 

coronary arteries.  We were consulted to evaluate for right 

video-assisted thoracoscopy, pleurodesis and placement of PleurX 

catheter.

 

PAST MEDICAL HISTORY:

Other includes recurrent pleural effusions, poorly differentiated 

adenocarcinoma of the lung, COPD, coronary artery disease, seizure 

disorder, hypertension, hypothyroidism, history of DVT, atrial 

fibrillation.  She is not on anticoagulation secondary to 

gastrointestinal bleed in the past.  Peripheral vascular disease and 

iron deficiency anemia.

 

PAST SURGICAL HISTORY:

Include iliac stents x 2, gastric stent x 1, brain aneurysm clipping, 

tonsillectomy, tubal ligation.

 

ALLERGIES:

SHE HAS NO KNOWN ALLERGIES.

 

HOME MEDICATIONS:

Include:

1.  Amiodarone.

2.  Digoxin.

3.  Zetia.

4.  Lipitor.

5.  Lopressor.

6.  Cardizem.

7.  Aspirin.

8.  Dilantin.

9.  Protonix.

10.  Levothyroxine.

 

FAMILY HISTORY:

Noncontributory.

 

SOCIAL HISTORY:

Former smoker, occasional alcohol, no illicit drugs.

 

REVIEW OF SYSTEMS:

As above in the HPI, other 12 systems unremarkable.

 

PHYSICAL EXAMINATION:

VITAL SIGNS:  Blood pressure 115/60, heart rate is 60, afebrile, O2 

saturation 98% on 2 liters.

GENERAL:  The patient is awake, alert, in no acute distress.

HEENT:  Head is normocephalic, atraumatic.  Pupils equal and reactive.

 Oral mucosa pink, moist.

NECK:  Supple.  No JVD.

CARDIOVASCULAR:  Heart sounds S1, S2.  Regular rate and rhythm.  No 

rubs, murmurs or gallops.

CHEST:  She is very diminished in the left lower mid lobe versus the 

right.  No use of the accessory muscles.

ABDOMEN:  Soft, nontender.  No masses or organomegaly.

EXTREMITIES:  No cyanosis, clubbing, or edema.

 

LABORATORY DATA:

Shows hemoglobin 9.7, hematocrit of 32, white cell count of 3, 

platelet count of 170.  Sodium 138, potassium 4.3, BUN of 11, 

creatinine 0.7.  BNP at 267.  INR 1.1.

 

RADIOLOGICAL EXAMS:

As above.

 

IMPRESSION AND PLAN:

This is a 78-year-old female with history of poorly differentiated 

adenocarcinoma of the lung, status post radiation therapy, last dose 

in February, with recurrent pleural effusions.  At this time, we did 

discuss procedures, alternatives and risks with the patient and she is

agreeable to proceed with left video-assisted thoracoscopic procedure 

with pleurodesis and placement of a PleurX catheter.  Plan will be for

Friday.

 

 

__________________________________ __________________________________

Jacqueline R. Terwilliger, ARNP Sohit K. Khanna, MD JRT/VAISHALI

D: 06/06/2018, 12:20 PM

T: 06/06/2018, 12:48 PM

Visit #: [id number]

Job #: [id number]

## 2018-06-06 NOTE — RADRPT
EXAM DATE:  6/6/2018 10:17 AM EDT

AGE/SEX:        78 years / Female



INDICATIONS:  Pleural effusion, shortness of breath.



CLINICAL DATA:  This is the patient's initial encounter. Patient reports that signs and symptoms have
 been present for 1 month and indicates a pain score of 0/10. 

                                                                          

MEDICAL/SURGICAL HISTORY:   Carcinoma, lung.  Chronic obstructive pulmonary disease.  Aneurysm, intra
cranial.  Seizures, cardiovascular disease, hypertension.  Tubal ligation.



RADIATION DOSE:  5.64 CTDI (mGy)







COMPARISON:      [facility], CT PULMONARY ANGIOGRAM, 1/25/2018.  [facility], CTA AORTA W/ RUNOFF, 2/19/2018.  . 





TECHNIQUE:  Multiple contiguous axial images were obtained through the chest without contrast.  Image
s were obtained in suspended respiration using multiple row detector helical technique.  Using automa
say exposure control and adjustment of the mA and/or kV according to patient size, radiation dose was
 kept as low as reasonably achievable to obtain optimal diagnostic quality images.



FINDINGS: 

Approximate 2.4 cm spiculated mass is present left upper lobe highly suspicious for malignancy larger
 since the prior study of 1/2018 at which time it measured almost 2 cm measured in the same dimension
. There is irregular opacity in right upper lobe medially measures 1.2 cm in size. There is also vagu
e parenchymal infiltrate in right upper lobe posteriorly and laterally. Parenchymal infiltrate is als
o present within lingula and the right lower lobe and there is a large left pleural effusion with lef
t lung base consolidation and/or compressive collapse. Vague parenchymal infiltrate is also seen in r
ight lower lobe. Approximate 7 mm nodule is present in right lower lobe with a separate tiny 4 to 5 m
m nodule right lower lobe. There is a small 4 to 5 mm right middle lobe nodule not significantly weber
ged. The previously seen 1.7 cm right lower lobe nodule is not clearly visualized. There is a tiny ri
ght pleural effusion smaller since the prior exam. There are small lymph nodes in the precarinal loca
tion the largest measures 1.6 cm in size nonspecific could be benign. There is dense calcifications o
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
e patient's prior CT angiogram from 2/19/2018. There are multiple rib fractures on the left side. The
re is a small bone island subcentimeter in size involving T7 vertebrae posteriorly on the left side. 
Coronary calcifications are seen.



CONCLUSION:  

1.  Left upper lobe malignant mass appears larger since the prior study from 1/2018.

2.  Parenchymal consolidation infiltrates are seen bilaterally some of which were not present previou
sly particularly in right lung.

3.  The right lower lobe nodule which measured 1.7 cm previously is not visualized and there are a co
uple of tiny subcentimeter nodules right lower lobe not clearly identified previously. Right middle l
obe nodule is similar and not changed.

4.  Large left pleural effusion and a tiny right pleural effusion.

5.  Dense vascular calcifications of coronary is an aorta and high-grade stenosis of celiac and SMA a
rteries are suspected discussed on the patient's prior CT angiogram of 2/2018.



Electronically signed by: KELLY Kumar MD  6/6/2018 11:04 AM EDT

## 2018-06-07 VITALS
SYSTOLIC BLOOD PRESSURE: 132 MMHG | DIASTOLIC BLOOD PRESSURE: 61 MMHG | OXYGEN SATURATION: 94 % | TEMPERATURE: 97.6 F | RESPIRATION RATE: 18 BRPM | HEART RATE: 76 BPM

## 2018-06-07 VITALS
DIASTOLIC BLOOD PRESSURE: 59 MMHG | TEMPERATURE: 97.9 F | HEART RATE: 92 BPM | OXYGEN SATURATION: 95 % | RESPIRATION RATE: 17 BRPM | SYSTOLIC BLOOD PRESSURE: 125 MMHG

## 2018-06-07 VITALS
HEART RATE: 66 BPM | OXYGEN SATURATION: 94 % | RESPIRATION RATE: 20 BRPM | DIASTOLIC BLOOD PRESSURE: 60 MMHG | SYSTOLIC BLOOD PRESSURE: 127 MMHG | TEMPERATURE: 97.6 F

## 2018-06-07 VITALS
DIASTOLIC BLOOD PRESSURE: 61 MMHG | TEMPERATURE: 98.2 F | OXYGEN SATURATION: 97 % | HEART RATE: 70 BPM | SYSTOLIC BLOOD PRESSURE: 140 MMHG | RESPIRATION RATE: 20 BRPM

## 2018-06-07 VITALS
OXYGEN SATURATION: 96 % | RESPIRATION RATE: 18 BRPM | HEART RATE: 90 BPM | DIASTOLIC BLOOD PRESSURE: 86 MMHG | TEMPERATURE: 98.7 F | SYSTOLIC BLOOD PRESSURE: 140 MMHG

## 2018-06-07 VITALS
DIASTOLIC BLOOD PRESSURE: 60 MMHG | SYSTOLIC BLOOD PRESSURE: 130 MMHG | RESPIRATION RATE: 20 BRPM | OXYGEN SATURATION: 97 % | TEMPERATURE: 97.9 F | HEART RATE: 74 BPM

## 2018-06-07 VITALS — OXYGEN SATURATION: 97 %

## 2018-06-07 VITALS — OXYGEN SATURATION: 96 %

## 2018-06-07 LAB
ERYTHROCYTE [DISTWIDTH] IN BLOOD BY AUTOMATED COUNT: 21.5 % (ref 11.6–17.2)
HCT VFR BLD CALC: 31.9 % (ref 35–46)
HGB BLD-MCNC: 10 GM/DL (ref 11.6–15.3)
INR PPP: 1.1 RATIO
MCH RBC QN AUTO: 26.4 PG (ref 27–34)
MCHC RBC AUTO-ENTMCNC: 31.4 % (ref 32–36)
MCV RBC AUTO: 84 FL (ref 80–100)
PLATELET # BLD: 168 TH/MM3 (ref 150–450)
PMV BLD AUTO: 7.1 FL (ref 7–11)
PROTHROMBIN TIME: 10.7 SEC (ref 9.8–11.6)
RBC # BLD AUTO: 3.8 MIL/MM3 (ref 4–5.3)
WBC # BLD AUTO: 4.2 TH/MM3 (ref 4–11)

## 2018-06-07 RX ADMIN — IPRATROPIUM BROMIDE AND ALBUTEROL SULFATE PRN AMPULE: .5; 3 SOLUTION RESPIRATORY (INHALATION) at 03:14

## 2018-06-07 RX ADMIN — DIGOXIN SCH MG: 125 TABLET ORAL at 08:13

## 2018-06-07 RX ADMIN — PANTOPRAZOLE SCH MG: 40 TABLET, DELAYED RELEASE ORAL at 20:58

## 2018-06-07 RX ADMIN — IPRATROPIUM BROMIDE AND ALBUTEROL SULFATE SCH AMPULE: .5; 3 SOLUTION RESPIRATORY (INHALATION) at 20:32

## 2018-06-07 RX ADMIN — Medication SCH ML: at 08:14

## 2018-06-07 RX ADMIN — STANDARDIZED SENNA CONCENTRATE AND DOCUSATE SODIUM SCH TAB: 8.6; 5 TABLET, FILM COATED ORAL at 08:13

## 2018-06-07 RX ADMIN — ATORVASTATIN CALCIUM SCH MG: 80 TABLET, FILM COATED ORAL at 20:58

## 2018-06-07 RX ADMIN — Medication SCH ML: at 20:58

## 2018-06-07 RX ADMIN — IPRATROPIUM BROMIDE AND ALBUTEROL SULFATE SCH AMPULE: .5; 3 SOLUTION RESPIRATORY (INHALATION) at 14:26

## 2018-06-07 RX ADMIN — METOPROLOL TARTRATE SCH MG: 25 TABLET, FILM COATED ORAL at 21:02

## 2018-06-07 RX ADMIN — METOPROLOL TARTRATE SCH MG: 25 TABLET, FILM COATED ORAL at 08:14

## 2018-06-07 RX ADMIN — PHENYTOIN SODIUM SCH MG: 100 CAPSULE, EXTENDED RELEASE ORAL at 20:59

## 2018-06-07 RX ADMIN — IPRATROPIUM BROMIDE AND ALBUTEROL SULFATE SCH AMPULE: .5; 3 SOLUTION RESPIRATORY (INHALATION) at 11:45

## 2018-06-07 RX ADMIN — AMIODARONE HYDROCHLORIDE SCH MG: 200 TABLET ORAL at 08:14

## 2018-06-07 RX ADMIN — EZETIMIBE SCH MG: 10 TABLET ORAL at 08:13

## 2018-06-07 RX ADMIN — STANDARDIZED SENNA CONCENTRATE AND DOCUSATE SODIUM SCH TAB: 8.6; 5 TABLET, FILM COATED ORAL at 21:00

## 2018-06-07 RX ADMIN — PANTOPRAZOLE SCH MG: 40 TABLET, DELAYED RELEASE ORAL at 08:13

## 2018-06-07 RX ADMIN — LEVOTHYROXINE SODIUM SCH MCG: 150 TABLET ORAL at 06:22

## 2018-06-07 RX ADMIN — PHENYTOIN SODIUM SCH MLS/HR: 50 INJECTION INTRAMUSCULAR; INTRAVENOUS at 07:20

## 2018-06-07 RX ADMIN — DILTIAZEM HYDROCHLORIDE SCH MG: 240 CAPSULE, EXTENDED RELEASE ORAL at 20:59

## 2018-06-07 NOTE — HHI.PR
Subjective


Remarks


The patient says she was having some shortness of breath.  She said that she 

had a breathing treatment at 4 AM and she would like to have another one.  She 

said her surgery was scheduled for tomorrow afternoon.  Her son was at the 

bedside.





Objective


Vitals





Vital Signs








  Date Time  Temp Pulse Resp B/P (MAP) Pulse Ox O2 Delivery O2 Flow Rate FiO2


 


6/7/18 08:16      Room Air  


 


6/7/18 07:36 97.9 74 20 130/60 (83) 97   


 


6/7/18 05:19 97.6 76 18 132/61 (84) 94   


 


6/7/18 04:00      Nasal Cannula 2.00 


 


6/7/18 03:17     96 Nasal Cannula 2.00 


 


6/7/18 01:14 98.7 90 18 140/86 (104) 96   


 


6/6/18 21:11     96 Nasal Cannula 2.00 


 


6/6/18 20:26 97.4 80 18 114/62 (79) 98   


 


6/6/18 16:00 98.0 60 18 144/86 (105) 100   


 


6/6/18 12:00 97.6 78 16 155/65 (95) 95   














I/O      


 


 6/6/18 6/6/18 6/6/18 6/7/18 6/7/18 6/7/18





 07:00 15:00 23:00 07:00 15:00 23:00


 


Intake Total   868 ml   


 


Balance   868 ml   


 


      


 


Intake IV Total   868 ml   


 


# Voids 2     








Result Diagram:  


6/7/18 0612                                                                    

            6/6/18 0830





Imaging





Last Impressions








Chest CT 6/6/18 0000 Signed





Impressions: 





 CONCLUSION:  





 1.  Left upper lobe malignant mass appears larger since the prior study from 1/ 2018.





 2.  Parenchymal consolidation infiltrates are seen bilaterally some of which we





 re not present previously particularly in right lung.





 3.  The right lower lobe nodule which measured 1.7 cm previously is not visuali





 zed and there are a couple of tiny subcentimeter nodules right lower lobe not c





 learly identified previously. Right middle lobe nodule is similar and not kearns





 ed.





 4.  Large left pleural effusion and a tiny right pleural effusion.





 5.  Dense vascular calcifications of coronary is an aorta and high-grade stenos





 is of celiac and SMA arteries are suspected discussed on the patient's prior CT





  angiogram of 2/2018.





  





 


 


Chest X-Ray 6/5/18 1637 Signed





Impressions: 





 CONCLUSION: 





 Persistently elevated left hemidiaphragm with a pleural effusion on the left ar





 e unchanged. Some adjacent consolidation as well





  





 








Objective Remarks


GENERAL: No distress.


SKIN: No rashes, ecchymoses or lesions. Cool and dry.


HEAD: Atraumatic. Normocephalic. No temporal or scalp tenderness.


EYES: Pupils equal round and reactive. Extraocular motions intact. No scleral 

icterus. No injection or drainage. 


ENT: Nose without bleeding, purulent drainage or septal hematoma. Throat 

without erythema, tonsillar hypertrophy or exudate. Uvula midline. Airway 

patent.


NECK: Trachea midline. No JVD or lymphadenopathy. Supple, nontender, no 

meningeal signs.


CARDIOVASCULAR: Regular rate and rhythm without murmurs, gallops, or rubs. 


RESPIRATORY: Decreased breath sounds left lung base.  Crackles appreciated.


GASTROINTESTINAL: Abdomen soft, non-tender, nondistended. No hepato-splenomegaly

, or palpable masses. No guarding.


MUSCULOSKELETAL: Extremities without clubbing, cyanosis, or edema. No joint 

tenderness, effusion, or edema noted. 


NEUROLOGICAL: Awake and alert. Cranial nerves II through XII intact.  Motor and 

sensory grossly within normal limits. Normal speech.





A/P


Assessment and Plan


Poorly differentiated adenocarcinoma of the lung with recurrent pleural effusion


Chest x-ray significant for pleural effusion on the left that appears 

unchanged. Cardiothoracic surgery consult appreciated. CT showed: Left upper 

lobe malignant mass appears larger since the prior study from 1/2018; 

Parenchymal consolidation infiltrates are seen bilaterally some of which were 

not present previously particularly in right lung; The right lower lobe nodule 

which measured 1.7 cm previously is not visualized and there are a couple of 

tiny subcentimeter nodules right lower lobe not clearly identified previously; 

Large left pleural effusion and a tiny right pleural effusion.


- Medical oncology consulted.


- VATS  and Pleurx catheter on Friday per CTS.


- oxygen and nebs as needed.  Add standing nebs.





Atrial fibrillation


Patient not on anticoagulation secondary to history of GI bleed.  Rate is 

controlled.


- Continue home medications.





Seizure disorder


No evidence of seizures.


- Continue home Dilantin.





Hypertension/CAD/hypothyroidism/peripheral vascular disease


Stable. 


- Holding home aspirin for procedure.


- Continue home medications.





Anemia


Likely s/t carcinoma. 


- follow CBC.





PPx: Holding pharmacologic anticoagulation for procedure











Fabián Silverio DO Jun 7, 2018 11:42

## 2018-06-08 VITALS
RESPIRATION RATE: 17 BRPM | DIASTOLIC BLOOD PRESSURE: 59 MMHG | SYSTOLIC BLOOD PRESSURE: 118 MMHG | HEART RATE: 80 BPM | TEMPERATURE: 98 F | OXYGEN SATURATION: 96 %

## 2018-06-08 VITALS
RESPIRATION RATE: 18 BRPM | DIASTOLIC BLOOD PRESSURE: 56 MMHG | SYSTOLIC BLOOD PRESSURE: 126 MMHG | TEMPERATURE: 97.8 F | OXYGEN SATURATION: 93 % | HEART RATE: 74 BPM

## 2018-06-08 VITALS
HEART RATE: 71 BPM | SYSTOLIC BLOOD PRESSURE: 144 MMHG | TEMPERATURE: 97.5 F | DIASTOLIC BLOOD PRESSURE: 70 MMHG | OXYGEN SATURATION: 96 % | RESPIRATION RATE: 18 BRPM

## 2018-06-08 VITALS
RESPIRATION RATE: 18 BRPM | DIASTOLIC BLOOD PRESSURE: 60 MMHG | SYSTOLIC BLOOD PRESSURE: 120 MMHG | HEART RATE: 89 BPM | OXYGEN SATURATION: 96 % | TEMPERATURE: 98 F

## 2018-06-08 VITALS — OXYGEN SATURATION: 99 %

## 2018-06-08 VITALS
OXYGEN SATURATION: 100 % | HEART RATE: 72 BPM | TEMPERATURE: 97.7 F | DIASTOLIC BLOOD PRESSURE: 54 MMHG | SYSTOLIC BLOOD PRESSURE: 117 MMHG | RESPIRATION RATE: 18 BRPM

## 2018-06-08 VITALS — OXYGEN SATURATION: 96 %

## 2018-06-08 PROCEDURE — 3E0T3BZ INTRODUCTION OF ANESTHETIC AGENT INTO PERIPHERAL NERVES AND PLEXI, PERCUTANEOUS APPROACH: ICD-10-PCS | Performed by: THORACIC SURGERY (CARDIOTHORACIC VASCULAR SURGERY)

## 2018-06-08 PROCEDURE — 0W9B30Z DRAINAGE OF LEFT PLEURAL CAVITY WITH DRAINAGE DEVICE, PERCUTANEOUS APPROACH: ICD-10-PCS | Performed by: THORACIC SURGERY (CARDIOTHORACIC VASCULAR SURGERY)

## 2018-06-08 PROCEDURE — 3E0L4GC INTRODUCTION OF OTHER THERAPEUTIC SUBSTANCE INTO PLEURAL CAVITY, PERCUTANEOUS ENDOSCOPIC APPROACH: ICD-10-PCS | Performed by: THORACIC SURGERY (CARDIOTHORACIC VASCULAR SURGERY)

## 2018-06-08 RX ADMIN — CLONIDINE HYDROCHLORIDE SCH MG: 0.1 INJECTION, SOLUTION EPIDURAL at 20:58

## 2018-06-08 RX ADMIN — IPRATROPIUM BROMIDE AND ALBUTEROL SULFATE SCH AMPULE: .5; 3 SOLUTION RESPIRATORY (INHALATION) at 08:16

## 2018-06-08 RX ADMIN — MORPHINE SULFATE PRN MG: 2 INJECTION, SOLUTION INTRAMUSCULAR; INTRAVENOUS at 20:59

## 2018-06-08 RX ADMIN — DIGOXIN SCH MG: 125 TABLET ORAL at 08:02

## 2018-06-08 RX ADMIN — ATORVASTATIN CALCIUM SCH MG: 80 TABLET, FILM COATED ORAL at 20:57

## 2018-06-08 RX ADMIN — STANDARDIZED SENNA CONCENTRATE AND DOCUSATE SODIUM SCH TAB: 8.6; 5 TABLET, FILM COATED ORAL at 20:57

## 2018-06-08 RX ADMIN — PHENYTOIN SODIUM SCH MLS/HR: 50 INJECTION INTRAMUSCULAR; INTRAVENOUS at 00:00

## 2018-06-08 RX ADMIN — PANTOPRAZOLE SCH MG: 40 TABLET, DELAYED RELEASE ORAL at 20:55

## 2018-06-08 RX ADMIN — METOPROLOL TARTRATE SCH MG: 25 TABLET, FILM COATED ORAL at 20:57

## 2018-06-08 RX ADMIN — LEVOTHYROXINE SODIUM SCH MCG: 150 TABLET ORAL at 05:40

## 2018-06-08 RX ADMIN — Medication SCH ML: at 20:59

## 2018-06-08 RX ADMIN — IPRATROPIUM BROMIDE AND ALBUTEROL SULFATE SCH AMPULE: .5; 3 SOLUTION RESPIRATORY (INHALATION) at 12:32

## 2018-06-08 RX ADMIN — EZETIMIBE SCH MG: 10 TABLET ORAL at 08:02

## 2018-06-08 RX ADMIN — STANDARDIZED SENNA CONCENTRATE AND DOCUSATE SODIUM SCH TAB: 8.6; 5 TABLET, FILM COATED ORAL at 08:04

## 2018-06-08 RX ADMIN — IPRATROPIUM BROMIDE AND ALBUTEROL SULFATE SCH AMPULE: .5; 3 SOLUTION RESPIRATORY (INHALATION) at 19:52

## 2018-06-08 RX ADMIN — DILTIAZEM HYDROCHLORIDE SCH MG: 240 CAPSULE, EXTENDED RELEASE ORAL at 20:58

## 2018-06-08 RX ADMIN — CLONIDINE HYDROCHLORIDE SCH MG: 0.1 INJECTION, SOLUTION EPIDURAL at 17:29

## 2018-06-08 RX ADMIN — Medication PRN ML: at 20:59

## 2018-06-08 RX ADMIN — Medication SCH ML: at 08:07

## 2018-06-08 RX ADMIN — AMIODARONE HYDROCHLORIDE SCH MG: 200 TABLET ORAL at 08:02

## 2018-06-08 RX ADMIN — LEVOTHYROXINE SODIUM SCH MCG: 150 TABLET ORAL at 09:18

## 2018-06-08 RX ADMIN — PANTOPRAZOLE SCH MG: 40 TABLET, DELAYED RELEASE ORAL at 08:02

## 2018-06-08 RX ADMIN — PANTOPRAZOLE SCH MG: 40 TABLET, DELAYED RELEASE ORAL at 20:56

## 2018-06-08 RX ADMIN — PHENYTOIN SODIUM SCH MG: 100 CAPSULE, EXTENDED RELEASE ORAL at 20:55

## 2018-06-08 RX ADMIN — METOPROLOL TARTRATE SCH MG: 25 TABLET, FILM COATED ORAL at 08:02

## 2018-06-08 RX ADMIN — PHENYTOIN SODIUM SCH MLS/HR: 50 INJECTION INTRAMUSCULAR; INTRAVENOUS at 16:40

## 2018-06-08 RX ADMIN — DOCUSATE CALCIUM SCH MG: 240 CAPSULE, LIQUID FILLED ORAL at 20:55

## 2018-06-08 NOTE — HHI.PR
Subjective


Remarks


The patient was anticipating surgery.  She said she had a few episodes of 

shortness of breath but her symptoms got better with breathing treatments.  She 

denied any anxiety.





Objective


Vitals





Vital Signs








  Date Time  Temp Pulse Resp B/P (MAP) Pulse Ox O2 Delivery O2 Flow Rate FiO2


 


6/8/18 12:35 97.5 71 18 144/70 (94) 96   


 


6/8/18 08:39 97.8 74 18 126/56 (79) 93   


 


6/8/18 08:17     96 Nasal Cannula 1.00 


 


6/8/18 07:58     93  1.00 


 


6/8/18 04:00 98.0 80 17 118/59 (78) 96   


 


6/8/18 00:00 98.0 89 18 120/60 (80) 96   


 


6/7/18 20:34     97   21


 


6/7/18 20:00 97.9 92 17 125/59 (81) 95   


 


6/7/18 16:39 98.2 70 20 140/61 (87) 97   














I/O      


 


 6/7/18 6/7/18 6/7/18 6/8/18 6/8/18 6/8/18





 07:00 15:00 23:00 07:00 15:00 23:00


 


Intake Total  1000 ml 1320 ml 800 ml  


 


Balance  1000 ml 1320 ml 800 ml  


 


      


 


Intake Oral   1320 ml 800 ml  


 


IV Total  1000 ml    


 


# Voids   6 2  


 


# Bowel Movements   0 0  








Result Diagram:  


6/7/18 0612                                                                    

            6/6/18 0830





Imaging





Last Impressions








Chest CT 6/6/18 0000 Signed





Impressions: 





 CONCLUSION:  





 1.  Left upper lobe malignant mass appears larger since the prior study from 1/ 2018.





 2.  Parenchymal consolidation infiltrates are seen bilaterally some of which we





 re not present previously particularly in right lung.





 3.  The right lower lobe nodule which measured 1.7 cm previously is not visuali





 zed and there are a couple of tiny subcentimeter nodules right lower lobe not c





 learly identified previously. Right middle lobe nodule is similar and not kearns





 ed.





 4.  Large left pleural effusion and a tiny right pleural effusion.





 5.  Dense vascular calcifications of coronary is an aorta and high-grade stenos





 is of celiac and SMA arteries are suspected discussed on the patient's prior CT





  angiogram of 2/2018.





  





 


 


Chest X-Ray 6/5/18 1637 Signed





Impressions: 





 CONCLUSION: 





 Persistently elevated left hemidiaphragm with a pleural effusion on the left ar





 e unchanged. Some adjacent consolidation as well





  





 








Objective Remarks


GENERAL: No distress.


SKIN: No rashes, ecchymoses or lesions. Cool and dry.


HEAD: Atraumatic. Normocephalic. No temporal or scalp tenderness.


EYES: Pupils equal round and reactive. Extraocular motions intact. No scleral 

icterus. No injection or drainage. 


ENT: Nose without bleeding, purulent drainage or septal hematoma. Throat 

without erythema, tonsillar hypertrophy or exudate. Uvula midline. Airway 

patent.


NECK: Trachea midline. No JVD or lymphadenopathy. Supple, nontender, no 

meningeal signs.


CARDIOVASCULAR: Regular rate and rhythm without murmurs, gallops, or rubs. 


RESPIRATORY: Decreased breath sounds left lung base.  Crackles appreciated.


GASTROINTESTINAL: Abdomen soft, non-tender, nondistended. No hepato-splenomegaly

, or palpable masses. No guarding.


MUSCULOSKELETAL: Extremities without clubbing, cyanosis, or edema. No joint 

tenderness, effusion, or edema noted. 


NEUROLOGICAL: Awake and alert. Cranial nerves II through XII intact.  Motor and 

sensory grossly within normal limits. Normal speech.





A/P


Assessment and Plan


Poorly differentiated adenocarcinoma of the lung with recurrent pleural effusion


Chest x-ray significant for pleural effusion on the left that appears 

unchanged. Cardiothoracic surgery consult appreciated. CT showed: Left upper 

lobe malignant mass appears larger since the prior study from 1/2018; 

Parenchymal consolidation infiltrates are seen bilaterally some of which were 

not present previously particularly in right lung; The right lower lobe nodule 

which measured 1.7 cm previously is not visualized and there are a couple of 

tiny subcentimeter nodules right lower lobe not clearly identified previously; 

Large left pleural effusion and a tiny right pleural effusion.


- Medical oncology consulted.


- VATS and Pleurx catheter today per CTS.


- oxygen and nebs as needed.  Added standing nebs.


- pain control as needed with a bowel regimen.


- IS. 





Atrial fibrillation


Patient not on anticoagulation secondary to history of GI bleed.  Rate is 

controlled.


- Continue home medications.





Seizure disorder


No evidence of seizures.


- Continue home Dilantin.





Hypertension/CAD/hypothyroidism/peripheral vascular disease


Stable. 


- Holding home aspirin for procedure.


- Continue home medications.





Anemia


Likely s/t carcinoma. 


- follow CBC.





PPx: Holding pharmacologic anticoagulation for procedure











Fabián Silverio DO Jun 8, 2018 12:42

## 2018-06-08 NOTE — RADRPT
EXAM DATE:  6/8/2018 5:23 PM EDT

AGE/SEX:        78 years / Female



INDICATIONS:  S/P thoracotomy.



CLINICAL DATA:  This is the patient's initial encounter. Patient reports that signs and symptoms have
 been present for 1 day and indicates a pain score of Nonresponsive. 

                                                                          

MEDICAL/SURGICAL HISTORY:       . Carcinoma, lung. Chronic obstructive pulmonary disease. Aneurysm, i
ntracranial. Seizures, cardiovascular disease, hypertension.   . Tubal ligation.



COMPARISON:         Chest x-ray 1/26/2018. 





FINDINGS:  

Since the previous examination there has been placement of a small caliber chest tube within the left
 base. Evacuation of the left effusion. No residual effusion on the left. A tiny right effusion. Hear
t is mildly enlarged. Interstitial prominence involving the perihilar distributions bilaterally is un
changed. No intra-alveolar infiltrate. No pneumothorax.





CONCLUSION: 

1.  Small caliber left thoracostomy tube without left effusion.

2.  Tiny right effusion.

3.  Cardiomegaly.

4.  Interstitial prominence which could relate to mild pulmonary edema.



Electronically signed by: Lonnie Gaytan MD  6/8/2018 5:39 PM EDT

## 2018-06-08 NOTE — MB
cc:

Familia Calzada MD

****

 

 

DATE:

06/08/2018

 

REASON FOR CONSULTATION:

The patient with a history of non-small cell lung cancer with 

recurrent pleural effusion.

 

CHIEF COMPLAINT:

Dyspnea.

 

HISTORY OF PRESENT ILLNESS:

This is a 78-year-old female who has a history of non-small cell lung 

cancer.  This is a poorly differentiated adenocarcinoma.  She has 

completed SBRT treatments to the lung mass.  Recent PET scan showed 

the left upper lobe mass to be slightly larger.  There is also a small

nodule in the right lobe.  This appeared to be smaller.  The patient 

has developed recurrent pleural effusion and has undergone multiple 

thoracenteses.  She was recently seen in the oncology clinic and we 

discussed the role of systemic immunotherapy since there is concern 

for recurrent disease.  However, she has declined any further 

treatment.  She has a history of iron deficiency anemia and has been 

treated with iron infusions.  She has a history of AVMs.  She also has

a history of atrial fibrillation.  She is not on anticoagulation due 

to history of GI bleeding.

 

The patient now presents to the emergency department with progressive 

dyspnea.  Chest x-ray on admission showed a persistently elevated left

hemidiaphragm with a pleural effusion on the left.  She also underwent

a CT scan of the chest which showed a left upper lobe malignant mass, 

which appears to be larger compared to the previous study in January 

(there was parenchymal consolidation infiltrate seen bilaterally).  

The right lower lobe nodule, which was 1.7 cm previously, was not 

visualized.  There were a few tiny sub-centimeter nodules in the right

lower lobe.  There was a large left-sided pleural effusion and a tiny 

right-sided pleural effusion.  The patient was admitted to the 

hospital.  CT Surgery has been consulted.  The patient is going to 

undergo VATS with pleurodesis and placement of PleurX catheter.  She 

is significantly short of breath on exertion.  She does not have any 

fevers or chills.  No hemoptysis.

 

REVIEW OF SYSTEMS:

A comprehensive review of system was completed which is negative 

except as described in the HPI.

 

PAST MEDICAL HISTORY:

History of non-small cell lung cancer, atrial fibrillation, history of

seizure disorder, history of anemia secondary to AVMs, history of 

atrial fibrillation, remote history of DVT.

 

PAST SURGICAL HISTORY:

Iliac stents x2, gastric stents x1, brain aneurysm with clipping, 

tonsillectomy, tubal ligation.

 

INPATIENT MEDICATIONS:

Reviewed:

1.  She is on amiodarone 200 mg p.o. daily.

2.  Digoxin 0.125 p.o. daily.

3.  Zetia 10 mg p.o. daily.

4.  Levothyroxine 150 mcg daily.

5.  DuoNeb.

6.  Lopressor 25 mg p.o. b.i.d.

7.  Pantoprazole 40 mg p.o. b.i.d.

8.  Diltiazem 240 p.o. at bedtime.

9.  Phenytoin 400 mg p.o. at bedtime.

10.  Atorvastatin 80 mg p.o. at bedtime.

11.  Zofran p.r.n.

12.  Morphine sulfate 2 mg p.r.n.

13.  Tylenol p.r.n.

14.  Senna p.r.n.

15.  Bisacodyl p.r.n.

16.  Lactulose p.r.n.

 

ALLERGIES:

NO KNOWN DRUG ALLERGIES.

 

FAMILY HISTORY:

Reviewed and is noncontributory to this admission.

 

SOCIAL HISTORY:

She is a former smoker.  She occasionally drinks alcohol.  No illicit 

drug use.

 

PHYSICAL EXAMINATION:

VITAL SIGNS:  Blood pressure is 144/70, pulse in the 70s, temperature 

is 97.5, O2 saturations are 96% on room air.

GENERAL:  A cachectic, elderly, frail female in no apparent distress.

HEENT:  Pupils are equal, round, reactive to light.  EOMI.  No oral 

thrush.  No lesions.

NECK:  Supple.  No JVD.  No bruits.  No lymphadenopathy.

CHEST:  Clear to auscultation bilaterally.  Decreased breath sounds on

the left up to the mid-chest.

CARDIAC:  S1, S2.  Regular rate and rhythm.

ABDOMEN:  Soft, nontender, nondistended.  Bowel sounds are present.

EXTREMITIES:  Without any edema, erythema or cyanosis.

SKIN:  Without any petechiae, lesions, or bruises.

NEUROLOGIC:  No focal deficits.

PSYCHIATRIC:  Mood and affect is appropriate.

 

LABORATORY DATA:

Labs were reviewed in the EMR.

WBC 4.2, hemoglobin 10, platelet count 168.

Serum Chemistries:  Sodium 138, potassium 4.3, chloride 105, CO2 23.8,

BUN 11, creatinine 0.7.

GFR is 81.

Albumin is 3.5.

 

IMAGING STUDIES:

Imaging was reviewed in the EMR.

 

ASSESSMENT AND PLAN:

This is a 78-year-old female with a history of non-small cell lung 

cancer with adenocarcinoma histology who now has progressive disease. 

She also has recurrent pleural effusions.  She presents to the 

emergency room with progressive dyspnea.

1.  Progressive dyspnea due to a large pleural effusion.  She has had 

multiple thoracenteses in the past.  I agree with pleurodesis.  CT 

Surgery is following this patient and she is planned to undergo 

video-assisted thoracoscopic surgery with pleurodesis and PleurX 

catheter placement.

2.  Progressive/recurrent non-small cell lung cancer.  This will be 

addressed in the outpatient setting.  The patient has refused any 

further treatment at this time.

3.  Anemia secondary to a history of arteriovenous malformations and 

underlying malignancy.  She has received iron infusions in the past.  

We will repeat anemia studies while she is inpatient, we may give her 

additional iron infusions.

4.  History of atrial fibrillation.  She is rate controlled.  She is 

not a candidate for anticoagulation due to history of gastrointestinal

bleeding.

 

Thank you for allowing me to participate in the care of this patient. 

I will continue to follow this patient along.

 

 

__________________________________

MD JULIA Larsen/PA

D: 06/08/2018, 03:57 PM

T: 06/08/2018, 04:37 PM

Visit #: [id number]

Job #: [id number]

## 2018-06-08 NOTE — PD.CAR.PN
CVT Progress Note


Subjective/Hospital Course:


 78-year-old female with history of poorly differentiated adenocarcinoma of the 

lung.  She has completed radiation therapy to the lung mass.  Her last one was 

in February.  She is followed by Dr. Da Lyles, Dr. Familia Calzada.  Per the 

oncology note, she recently underwent a PET scan.  The left upper lobe was 

slightly larger.  There is a small nodule in the right lower lobe, which was 

also slightly larger.  She has had recurrent pleural effusions in the left lung 

and 


has undergone multiple thoracenteses.  Per the note she has preferred not to be 

treated with systemic therapy.  She had a recent chest x-ray that showed about 

250 mL in the left lung; however, she came in to the emergency room with 

increasing shortness of breath, recurrent left pleural effusion.  CT chest 

shows large left pleural effusion with consolidation or compressive collapse.  

She also still has a 2.4 cm spiculated mass in the left upper lobe.  The left 

upper lobe malignant mass appears larger than from 01/2018.  She has a right 

lower lobe nodule.  She has a significant dense vascular calcification of the 

coronary arteries.  We were consulted to evaluate for right video-assisted 

thoracoscopy, pleurodesis and placement of PleurX catheter.


 


PAST MEDICAL HISTORY: Other includes recurrent pleural effusions, poorly 

differentiated adenocarcinoma of the lung, COPD, coronary artery disease, 

seizure disorder, hypertension, hypothyroidism, history of DVT, atrial 


fibrillation.  She is not on anticoagulation secondary to gastrointestinal 

bleed in the past.  Peripheral vascular disease and iron deficiency anemia.


 


6/8


for surgery today


Objective:





Vital Signs








  Date Time  Temp Pulse Resp B/P (MAP) Pulse Ox O2 Delivery O2 Flow Rate FiO2


 


6/8/18 12:35 97.5 71 18 144/70 (94) 96   


 


6/8/18 08:39 97.8 74 18 126/56 (79) 93   


 


6/8/18 08:17     96 Nasal Cannula 1.00 


 


6/8/18 07:58     93  1.00 


 


6/8/18 04:00 98.0 80 17 118/59 (78) 96   


 


6/8/18 00:00 98.0 89 18 120/60 (80) 96   


 


6/7/18 20:34     97   21


 


6/7/18 20:00 97.9 92 17 125/59 (81) 95   


 


6/7/18 16:39 98.2 70 20 140/61 (87) 97   








Result Diagram:  


6/7/18 0612                                                                    

            6/6/18 0830








(1) Paroxysmal atrial fibrillation


(2) Iron deficiency anemia


(3) CAD (coronary artery disease)


(4) Mass of left lung


(5) Lung cancer











Terwilliger,Jacqueline R. ARNP Jun 8, 2018 14:52

## 2018-06-08 NOTE — EKG
Date Performed: 2018       Time Performed: 19:39:53

 

PTAGE:      78 years

 

EKG:      ATRIAL FIBRILLATION MODERATE INTRAVENTRICULAR CONDUCTION DELAY NONSPECIFIC ST & T-WAVE ABNO
RMALITY ABNORMAL ECG Since the 

 

 PREVIOUS TRACING            , no significant change noted PREVIOUS TRACIN2018 15.32

 

DOCTOR:   Socorro Lainez  Interpretating Date/Time  2018 15:59:46

## 2018-06-08 NOTE — PD.OP
cc:   Kenya Balderas MD


__________________________________________________





Operative Report


Date of Surgery:  Jun 8, 2018


Preoperative Diagnosis:  


Postoperative Diagnosis:  


Procedure:


1. Left Video-Assisted Thoracoscopic Surgery (VATS). 


2. Evacuation of Pleural Effusion. 


3. PleurX catheter placement. 


4. Betadine Pleurodesis. 


5. Intercostal Nerve Block


Surgeon:


Kenya Balderas


Assistant(s):


SHEA Archer


Operation and Findings:





PREOPERATIVE DIAGNOSES 


1. Left Recurrent Pleural Effusion. 


2. Lung Cancer


3. COPD 





POSTOPERATIVE DIAGNOSES 


Same





SURGICAL PROCEDURE 


1. Left Video-Assisted Thoracoscopic Surgery (VATS). 


2. Evacuation of Pleural Effusion. 


3. PleurX catheter placement. 


4. Betadine Pleurodesis. 


5. Intercostal Nerve Block





SURGEON 


Kenya Balderas MD 





ASSISTANT 


YULISSA Santos





ANESTHESIA 


General double lumen endotracheal. 





ANESTHETIST 


BRIANNE Toribio MD





PREPARATION 


ChloraPrep. 





COUNTS 


Needle, sponge, and instrument counts are correct. 





DRAINS 


One PleurX catheter. 





COMPLICATIONS 


None. 





INDICATIONS 


The patient is a 79 yo lady with lung cancer status post previous multiple left 

thoracentesis for recurrent pleural effusion. The patient is being brought to 

the operating room for drainage and pleurodesis. 





DESCRIPTION OF PROCEDURE 


The patient was brought to the operating room and placed supine on the OR 

table. Following the induction of adequate general double lumen endotracheal 

anesthesia and placement of appropriate monitoring devices, the patient was 

placed in the right lateral decubitus position. The left chest and surrounding 

areas were then prepped and draped in a standard sterile fashion. A 5 mm camera 

port was introduced into the 8th intercostal space in posterior axillary line, 

and the camera introduced. A second 5 mm port was then placed anteriorly under 

direct visual guidance. Through the port, the suction device was advanced, and 

approximately 1500 mls of serous fluid evacuated and sent for cytologic and 

microbiologic analysis. Complete evacuation of all fluid was confirmed under 

visual guidance.Through the port site, Betadine 10% solution pleurodesis was 

performed. The anterior port was removed and a PleurX catheter was introduced 

and tunneled for about 5 cm and exited through the anterolateral chest wall. 

This was maintained in place with a nonabsorbable suture. Both of the entry 

sites were injected with 0.5% Marcaine as was the port site. Following 

confirmation of the catheter in the proper place, the incisions were closed 

with 3 layers. The PleurX was attached to a Pleur-evac drain, and sterile 

dressing applied. Intercostal nerve block was performed using Marcaine. The 

patient tolerated the procedure well and was extubated and transferred to the 

recovery room in stable condition.











Kenya Balderas MD Jun 8, 2018 16:22

## 2018-06-09 VITALS
TEMPERATURE: 98.1 F | DIASTOLIC BLOOD PRESSURE: 52 MMHG | RESPIRATION RATE: 18 BRPM | OXYGEN SATURATION: 95 % | SYSTOLIC BLOOD PRESSURE: 104 MMHG | HEART RATE: 76 BPM

## 2018-06-09 VITALS — OXYGEN SATURATION: 94 %

## 2018-06-09 VITALS
RESPIRATION RATE: 16 BRPM | DIASTOLIC BLOOD PRESSURE: 58 MMHG | HEART RATE: 83 BPM | OXYGEN SATURATION: 99 % | SYSTOLIC BLOOD PRESSURE: 98 MMHG | TEMPERATURE: 98 F

## 2018-06-09 VITALS
OXYGEN SATURATION: 99 % | RESPIRATION RATE: 18 BRPM | DIASTOLIC BLOOD PRESSURE: 51 MMHG | TEMPERATURE: 98.1 F | HEART RATE: 57 BPM | SYSTOLIC BLOOD PRESSURE: 104 MMHG

## 2018-06-09 VITALS — OXYGEN SATURATION: 99 %

## 2018-06-09 VITALS
TEMPERATURE: 98.4 F | RESPIRATION RATE: 16 BRPM | DIASTOLIC BLOOD PRESSURE: 62 MMHG | OXYGEN SATURATION: 93 % | HEART RATE: 86 BPM | SYSTOLIC BLOOD PRESSURE: 98 MMHG

## 2018-06-09 VITALS
SYSTOLIC BLOOD PRESSURE: 95 MMHG | OXYGEN SATURATION: 100 % | TEMPERATURE: 97.6 F | RESPIRATION RATE: 18 BRPM | DIASTOLIC BLOOD PRESSURE: 49 MMHG | HEART RATE: 55 BPM

## 2018-06-09 VITALS
OXYGEN SATURATION: 99 % | RESPIRATION RATE: 16 BRPM | DIASTOLIC BLOOD PRESSURE: 61 MMHG | TEMPERATURE: 98.8 F | SYSTOLIC BLOOD PRESSURE: 101 MMHG | HEART RATE: 80 BPM

## 2018-06-09 VITALS — OXYGEN SATURATION: 96 %

## 2018-06-09 LAB
% SATURATION IRON PROFILE: 10.5 % (ref 20–50)
ERYTHROCYTE [DISTWIDTH] IN BLOOD BY AUTOMATED COUNT: 21.2 % (ref 11.6–17.2)
FERRITIN SERPL-MCNC: 55 NG/ML (ref 8–252)
HCT VFR BLD CALC: 30.4 % (ref 35–46)
HGB BLD-MCNC: 9.5 GM/DL (ref 11.6–15.3)
IRON (FE): 31 MCG/DL (ref 50–170)
MCH RBC QN AUTO: 26.4 PG (ref 27–34)
MCHC RBC AUTO-ENTMCNC: 31.3 % (ref 32–36)
MCV RBC AUTO: 84.3 FL (ref 80–100)
PLATELET # BLD: 162 TH/MM3 (ref 150–450)
PMV BLD AUTO: 7.1 FL (ref 7–11)
RBC # BLD AUTO: 3.6 MIL/MM3 (ref 4–5.3)
TIBC SERPL-MCNC: 295 MCG/DL (ref 250–450)
VIT B12 SERPL-MCNC: 1806 PG/ML (ref 193–986)
WBC # BLD AUTO: 3.9 TH/MM3 (ref 4–11)

## 2018-06-09 RX ADMIN — CLONIDINE HYDROCHLORIDE SCH MG: 0.1 INJECTION, SOLUTION EPIDURAL at 05:56

## 2018-06-09 RX ADMIN — DIGOXIN SCH MG: 125 TABLET ORAL at 08:10

## 2018-06-09 RX ADMIN — PHENYTOIN SODIUM SCH MG: 100 CAPSULE, EXTENDED RELEASE ORAL at 21:13

## 2018-06-09 RX ADMIN — DOCUSATE CALCIUM SCH MG: 240 CAPSULE, LIQUID FILLED ORAL at 21:13

## 2018-06-09 RX ADMIN — STANDARDIZED SENNA CONCENTRATE AND DOCUSATE SODIUM SCH TAB: 8.6; 5 TABLET, FILM COATED ORAL at 21:14

## 2018-06-09 RX ADMIN — EZETIMIBE SCH MG: 10 TABLET ORAL at 08:11

## 2018-06-09 RX ADMIN — DILTIAZEM HYDROCHLORIDE SCH MG: 240 CAPSULE, EXTENDED RELEASE ORAL at 21:00

## 2018-06-09 RX ADMIN — HYDROCODONE BITARTRATE AND ACETAMINOPHEN PRN TAB: 5; 325 TABLET ORAL at 00:14

## 2018-06-09 RX ADMIN — MORPHINE SULFATE PRN MG: 2 INJECTION, SOLUTION INTRAMUSCULAR; INTRAVENOUS at 05:54

## 2018-06-09 RX ADMIN — PANTOPRAZOLE SCH MG: 40 TABLET, DELAYED RELEASE ORAL at 21:13

## 2018-06-09 RX ADMIN — IPRATROPIUM BROMIDE AND ALBUTEROL SULFATE SCH AMPULE: .5; 3 SOLUTION RESPIRATORY (INHALATION) at 09:01

## 2018-06-09 RX ADMIN — PANTOPRAZOLE SCH MG: 40 TABLET, DELAYED RELEASE ORAL at 08:11

## 2018-06-09 RX ADMIN — PANTOPRAZOLE SCH MG: 40 TABLET, DELAYED RELEASE ORAL at 21:00

## 2018-06-09 RX ADMIN — METOPROLOL TARTRATE SCH MG: 25 TABLET, FILM COATED ORAL at 08:09

## 2018-06-09 RX ADMIN — PHENYTOIN SODIUM SCH MLS/HR: 50 INJECTION INTRAMUSCULAR; INTRAVENOUS at 00:14

## 2018-06-09 RX ADMIN — Medication SCH ML: at 08:11

## 2018-06-09 RX ADMIN — Medication SCH ML: at 21:14

## 2018-06-09 RX ADMIN — METOPROLOL TARTRATE SCH MG: 25 TABLET, FILM COATED ORAL at 21:00

## 2018-06-09 RX ADMIN — ATORVASTATIN CALCIUM SCH MG: 80 TABLET, FILM COATED ORAL at 21:13

## 2018-06-09 RX ADMIN — AMIODARONE HYDROCHLORIDE SCH MG: 200 TABLET ORAL at 08:11

## 2018-06-09 RX ADMIN — LEVOTHYROXINE SODIUM SCH MCG: 150 TABLET ORAL at 05:54

## 2018-06-09 RX ADMIN — IPRATROPIUM BROMIDE AND ALBUTEROL SULFATE SCH AMPULE: .5; 3 SOLUTION RESPIRATORY (INHALATION) at 19:54

## 2018-06-09 RX ADMIN — STANDARDIZED SENNA CONCENTRATE AND DOCUSATE SODIUM SCH TAB: 8.6; 5 TABLET, FILM COATED ORAL at 08:10

## 2018-06-09 RX ADMIN — HYDROCODONE BITARTRATE AND ACETAMINOPHEN PRN TAB: 5; 325 TABLET ORAL at 19:01

## 2018-06-09 RX ADMIN — Medication PRN ML: at 05:55

## 2018-06-09 RX ADMIN — IPRATROPIUM BROMIDE AND ALBUTEROL SULFATE SCH AMPULE: .5; 3 SOLUTION RESPIRATORY (INHALATION) at 14:42

## 2018-06-09 RX ADMIN — CLONIDINE HYDROCHLORIDE SCH MG: 0.1 INJECTION, SOLUTION EPIDURAL at 09:23

## 2018-06-09 NOTE — RADRPT
EXAM DATE:  6/9/2018 4:37 AM EDT

AGE/SEX:        78 years / Female



INDICATIONS:  Status post thoracotomy.



CLINICAL DATA:  This is the patient's subsequent encounter. Patient reports that signs and symptoms h
ave been present for 4 - 6 days and indicates a pain score of 0/10. 

                                                                          

MEDICAL/SURGICAL HISTORY:       .  Carcinoma, lung. Chronic obstructive pulmonary disease. Aneurysm, 
intracranial. Seizures, cardiovascular disease, hypertension.  . Tubal ligation.



COMPARISON:      [facility], CHEST SINGLE AP, 6/8/2018.  . 





FINDINGS:  

Left chest tube present without pneumothorax. Left basilar airspace disease slightly increased from J
une 8. Trace pleural fluid. Cardiomegaly. No pneumothorax.



CONCLUSION: 

Stable left chest tube without pneumothorax. Slight increase in left basilar airspace disease.



Electronically signed by: Daren Hanks MD  6/9/2018 4:39 AM EDT

## 2018-06-09 NOTE — HHI.PR
Subjective


Remarks


The patient was resting in bed.  Family was at the bedside.  The patient wanted 

to know how long the tube would be in place for and how long she would have to 

be in the hospital.  She says her pain was controlled.  Has not been very 

ambulatory secondary to the chest tube.  Discussed with nursing.





Objective


Vitals





Vital Signs








  Date Time  Temp Pulse Resp B/P (MAP) Pulse Ox O2 Delivery O2 Flow Rate FiO2


 


6/9/18 14:48     96 Nasal Cannula 1.00 


 


6/9/18 12:18 98.1 57 18 104/51 (68) 99   


 


6/9/18 10:07   16     


 


6/9/18 09:53     99 Nasal Cannula 2.00 


 


6/9/18 08:38 97.6 55 18 95/49 (64) 100   


 


6/9/18 07:00     99 Nasal Cannula 2.00 


 


6/9/18 04:00 98.8 80 16 101/61 (74) 99   


 


6/9/18 00:00 98.0 83 16 98/58 (71) 99   


 


6/8/18 20:00 97.7 72 18 117/54 (75) 100   


 


6/8/18 19:52     99   21


 


6/8/18 19:00      Nasal Cannula 2.00 


 


6/8/18 17:50  67 16  95 Nasal Cannula 2 


 


6/8/18 17:45 97.5 68 16 111/55 (73) 95 Nasal Cannula 2 


 


6/8/18 17:30  69 15 109/52 (71) 95 Nasal Cannula 2 


 


6/8/18 17:15  70 15 108/54 (72) 94 Nasal Cannula 2 


 


6/8/18 17:00  75 15 117/52 (73) 94 Nasal Cannula 2 


 


6/8/18 16:45  78 15 118/53 (74) 94 Nasal Cannula 2 


 


6/8/18 16:40 97.5 80 15 121/58 (79) 100 Nasal Cannula 3 














I/O      


 


 6/8/18 6/8/18 6/8/18 6/9/18 6/9/18 6/9/18





 07:00 15:00 23:00 07:00 15:00 23:00


 


Intake Total 800 ml  1000 ml 850 ml  


 


Output Total   2400 ml   


 


Balance 800 ml  -1400 ml 850 ml  


 


      


 


Intake Oral 800 ml   850 ml  


 


Other   1000 ml   


 


Output Chest Tube Drainage Total   900 ml   


 


Other   1500 ml   


 


# Voids 2   3  


 


# Bowel Movements 0   0  








Result Diagram:  


6/9/18 0701                                                                    

            6/6/18 0830





Imaging





Last Impressions








Chest X-Ray 6/9/18 0500 Signed





Impressions: 





 CONCLUSION: 





 Stable left chest tube without pneumothorax. Slight increase in left basilar ai





 rspace disease.





  





 


 


Chest CT 6/6/18 0000 Signed





Impressions: 





 CONCLUSION:  





 1.  Left upper lobe malignant mass appears larger since the prior study from 1/ 2018.





 2.  Parenchymal consolidation infiltrates are seen bilaterally some of which we





 re not present previously particularly in right lung.





 3.  The right lower lobe nodule which measured 1.7 cm previously is not visuali





 zed and there are a couple of tiny subcentimeter nodules right lower lobe not c





 learly identified previously. Right middle lobe nodule is similar and not kearns





 ed.





 4.  Large left pleural effusion and a tiny right pleural effusion.





 5.  Dense vascular calcifications of coronary is an aorta and high-grade stenos





 is of celiac and SMA arteries are suspected discussed on the patient's prior CT





  angiogram of 2/2018.





  





 








Objective Remarks


GENERAL: No distress.


SKIN: No rashes, ecchymoses or lesions. Cool and dry.


HEAD: Atraumatic. Normocephalic. No temporal or scalp tenderness.


EYES: Pupils equal round and reactive. Extraocular motions intact. No scleral 

icterus. No injection or drainage. 


ENT: Nose without bleeding, purulent drainage or septal hematoma. Throat 

without erythema, tonsillar hypertrophy or exudate. Uvula midline. Airway 

patent.


NECK: Trachea midline. No JVD or lymphadenopathy. Supple, nontender, no 

meningeal signs.


CARDIOVASCULAR: Regular rate and rhythm without murmurs, gallops, or rubs. 


RESPIRATORY: Decreased breath sounds left lung base.  Crackles appreciated.


GASTROINTESTINAL: Abdomen soft, non-tender, nondistended. No hepato-splenomegaly

, or palpable masses. No guarding.


MUSCULOSKELETAL: Chest tube in place. Extremities without clubbing, cyanosis, 

or edema. No joint tenderness, effusion, or edema noted. 


NEUROLOGICAL: Awake and alert. Cranial nerves II through XII intact.  Motor and 

sensory grossly within normal limits. Normal speech.





A/P


Assessment and Plan


Poorly differentiated adenocarcinoma of the lung with recurrent pleural effusion


Chest x-ray significant for pleural effusion on the left that appears 

unchanged. Cardiothoracic surgery consult appreciated. CT showed: Left upper 

lobe malignant mass appears larger since the prior study from 1/2018; 

Parenchymal consolidation infiltrates are seen bilaterally some of which were 

not present previously particularly in right lung; The right lower lobe nodule 

which measured 1.7 cm previously is not visualized and there are a couple of 

tiny subcentimeter nodules right lower lobe not clearly identified previously; 

Large left pleural effusion and a tiny right pleural effusion. S/p VATS and 

Pleurx catheter 6/8/18 per CTS. Medical oncology consult appreciated.


- oxygen and nebs as needed.  Added standing nebs.


- pain control as needed with a bowel regimen.


- IS. 


- wound care and anticoagulation per surgery.


- follow up with oncology.


- PT/ OT. 





Atrial fibrillation


Patient not on anticoagulation secondary to history of GI bleed.  Rate is 

controlled.


- Continue home medications.





Seizure disorder


No evidence of seizures.


- Continue home Dilantin.





Hypertension/CAD/hypothyroidism/peripheral vascular disease


Stable. 


- Holding home aspirin for procedure.


- Continue home medications.





Anemia


Likely s/t carcinoma. 


- follow CBC.





PPx: Per surgery











Fabián Silverio DO Jun 9, 2018 15:40

## 2018-06-10 VITALS
SYSTOLIC BLOOD PRESSURE: 118 MMHG | DIASTOLIC BLOOD PRESSURE: 71 MMHG | RESPIRATION RATE: 18 BRPM | TEMPERATURE: 98.3 F | HEART RATE: 71 BPM | OXYGEN SATURATION: 96 %

## 2018-06-10 VITALS
DIASTOLIC BLOOD PRESSURE: 60 MMHG | RESPIRATION RATE: 16 BRPM | TEMPERATURE: 97.3 F | SYSTOLIC BLOOD PRESSURE: 129 MMHG | OXYGEN SATURATION: 99 % | HEART RATE: 75 BPM

## 2018-06-10 VITALS
HEART RATE: 94 BPM | TEMPERATURE: 98.3 F | OXYGEN SATURATION: 97 % | RESPIRATION RATE: 18 BRPM | SYSTOLIC BLOOD PRESSURE: 122 MMHG | DIASTOLIC BLOOD PRESSURE: 65 MMHG

## 2018-06-10 VITALS
DIASTOLIC BLOOD PRESSURE: 68 MMHG | OXYGEN SATURATION: 96 % | RESPIRATION RATE: 18 BRPM | HEART RATE: 63 BPM | TEMPERATURE: 97.9 F | SYSTOLIC BLOOD PRESSURE: 157 MMHG

## 2018-06-10 VITALS
RESPIRATION RATE: 18 BRPM | HEART RATE: 94 BPM | TEMPERATURE: 97.9 F | OXYGEN SATURATION: 95 % | SYSTOLIC BLOOD PRESSURE: 118 MMHG | DIASTOLIC BLOOD PRESSURE: 73 MMHG

## 2018-06-10 VITALS — OXYGEN SATURATION: 95 %

## 2018-06-10 VITALS
HEART RATE: 67 BPM | DIASTOLIC BLOOD PRESSURE: 53 MMHG | OXYGEN SATURATION: 97 % | TEMPERATURE: 97.5 F | RESPIRATION RATE: 17 BRPM | SYSTOLIC BLOOD PRESSURE: 113 MMHG

## 2018-06-10 VITALS — OXYGEN SATURATION: 94 %

## 2018-06-10 LAB
BUN SERPL-MCNC: 25 MG/DL (ref 7–18)
CALCIUM SERPL-MCNC: 8.1 MG/DL (ref 8.5–10.1)
CHLORIDE SERPL-SCNC: 106 MEQ/L (ref 98–107)
CREAT SERPL-MCNC: 1.15 MG/DL (ref 0.5–1)
ERYTHROCYTE [DISTWIDTH] IN BLOOD BY AUTOMATED COUNT: 21.5 % (ref 11.6–17.2)
GFR SERPLBLD BASED ON 1.73 SQ M-ARVRAT: 46 ML/MIN (ref 89–?)
GLUCOSE SERPL-MCNC: 80 MG/DL (ref 74–106)
HCO3 BLD-SCNC: 22.3 MEQ/L (ref 21–32)
HCT VFR BLD CALC: 30.6 % (ref 35–46)
HGB BLD-MCNC: 9.6 GM/DL (ref 11.6–15.3)
MAGNESIUM SERPL-MCNC: 2.1 MG/DL (ref 1.5–2.5)
MCH RBC QN AUTO: 26.6 PG (ref 27–34)
MCHC RBC AUTO-ENTMCNC: 31.4 % (ref 32–36)
MCV RBC AUTO: 84.6 FL (ref 80–100)
PLATELET # BLD: 159 TH/MM3 (ref 150–450)
PMV BLD AUTO: 7.9 FL (ref 7–11)
RBC # BLD AUTO: 3.62 MIL/MM3 (ref 4–5.3)
SODIUM SERPL-SCNC: 137 MEQ/L (ref 136–145)
WBC # BLD AUTO: 4.7 TH/MM3 (ref 4–11)

## 2018-06-10 RX ADMIN — IPRATROPIUM BROMIDE AND ALBUTEROL SULFATE SCH AMPULE: .5; 3 SOLUTION RESPIRATORY (INHALATION) at 19:34

## 2018-06-10 RX ADMIN — PANTOPRAZOLE SCH MG: 40 TABLET, DELAYED RELEASE ORAL at 20:10

## 2018-06-10 RX ADMIN — AMIODARONE HYDROCHLORIDE SCH MG: 200 TABLET ORAL at 08:52

## 2018-06-10 RX ADMIN — STANDARDIZED SENNA CONCENTRATE AND DOCUSATE SODIUM SCH TAB: 8.6; 5 TABLET, FILM COATED ORAL at 20:12

## 2018-06-10 RX ADMIN — METOPROLOL TARTRATE SCH MG: 25 TABLET, FILM COATED ORAL at 08:52

## 2018-06-10 RX ADMIN — MORPHINE SULFATE PRN MG: 2 INJECTION, SOLUTION INTRAMUSCULAR; INTRAVENOUS at 08:52

## 2018-06-10 RX ADMIN — PHENYTOIN SODIUM SCH MG: 100 CAPSULE, EXTENDED RELEASE ORAL at 20:12

## 2018-06-10 RX ADMIN — DOCUSATE CALCIUM SCH MG: 240 CAPSULE, LIQUID FILLED ORAL at 20:12

## 2018-06-10 RX ADMIN — STANDARDIZED SENNA CONCENTRATE AND DOCUSATE SODIUM SCH TAB: 8.6; 5 TABLET, FILM COATED ORAL at 08:52

## 2018-06-10 RX ADMIN — SODIUM BICARBONATE SCH MLS/HR: 84 INJECTION, SOLUTION INTRAVENOUS at 15:25

## 2018-06-10 RX ADMIN — LEVOTHYROXINE SODIUM SCH MCG: 150 TABLET ORAL at 05:34

## 2018-06-10 RX ADMIN — PHENYTOIN SODIUM SCH MLS/HR: 50 INJECTION INTRAMUSCULAR; INTRAVENOUS at 02:00

## 2018-06-10 RX ADMIN — ATORVASTATIN CALCIUM SCH MG: 80 TABLET, FILM COATED ORAL at 20:11

## 2018-06-10 RX ADMIN — MORPHINE SULFATE PRN MG: 2 INJECTION, SOLUTION INTRAMUSCULAR; INTRAVENOUS at 18:12

## 2018-06-10 RX ADMIN — Medication SCH ML: at 20:12

## 2018-06-10 RX ADMIN — DIGOXIN SCH MG: 125 TABLET ORAL at 08:52

## 2018-06-10 RX ADMIN — IPRATROPIUM BROMIDE AND ALBUTEROL SULFATE SCH AMPULE: .5; 3 SOLUTION RESPIRATORY (INHALATION) at 08:22

## 2018-06-10 RX ADMIN — EZETIMIBE SCH MG: 10 TABLET ORAL at 08:52

## 2018-06-10 RX ADMIN — MORPHINE SULFATE PRN MG: 2 INJECTION, SOLUTION INTRAMUSCULAR; INTRAVENOUS at 22:59

## 2018-06-10 RX ADMIN — DILTIAZEM HYDROCHLORIDE SCH MG: 240 CAPSULE, EXTENDED RELEASE ORAL at 20:12

## 2018-06-10 RX ADMIN — Medication SCH ML: at 08:53

## 2018-06-10 RX ADMIN — IPRATROPIUM BROMIDE AND ALBUTEROL SULFATE SCH AMPULE: .5; 3 SOLUTION RESPIRATORY (INHALATION) at 12:28

## 2018-06-10 RX ADMIN — PANTOPRAZOLE SCH MG: 40 TABLET, DELAYED RELEASE ORAL at 20:12

## 2018-06-10 RX ADMIN — HYDROCODONE BITARTRATE AND ACETAMINOPHEN PRN TAB: 5; 325 TABLET ORAL at 00:15

## 2018-06-10 RX ADMIN — METOPROLOL TARTRATE SCH MG: 25 TABLET, FILM COATED ORAL at 20:12

## 2018-06-10 RX ADMIN — PANTOPRAZOLE SCH MG: 40 TABLET, DELAYED RELEASE ORAL at 08:53

## 2018-06-10 NOTE — HHI.PR
Subjective


Remarks


The patient was sitting in a chair.  Her family was at the bedside.  The 

patient said she had 2 episodes of shortness of breath earlier today.  She said 

she worked with physical therapy.  She said she was a little constipated.  

Discussed with nursing.





Objective


Vitals





Vital Signs








  Date Time  Temp Pulse Resp B/P (MAP) Pulse Ox O2 Delivery O2 Flow Rate FiO2


 


6/10/18 12:00 97.3 75 16 129/60 (83) 99   


 


6/10/18 08:57      Nasal Cannula 1.00 


 


6/10/18 08:23     95 Nasal Cannula 1.00 


 


6/10/18 08:00 97.5 67 17 113/53 (73) 97   


 


6/10/18 04:00 97.9 94 18 118/73 (88) 95   


 


6/10/18 00:01 97.9 63 18 157/68 (97) 96   


 


6/9/18 22:49      Nasal Cannula 2.00 


 


6/9/18 20:00 98.4 86 16 98/62 (74) 93   


 


6/9/18 19:54     94 Nasal Cannula 1.00 


 


6/9/18 15:39 98.1 76 18 104/52 (69) 95   


 


6/9/18 14:48     96 Nasal Cannula 1.00 














I/O      


 


 6/9/18 6/9/18 6/9/18 6/10/18 6/10/18 6/10/18





 07:00 15:00 23:00 07:00 15:00 23:00


 


Intake Total 850 ml     


 


Balance 850 ml     


 


      


 


Intake Oral 850 ml     


 


# Voids 3   6  


 


# Bowel Movements 0     








Result Diagram:  


6/10/18 0733                                                                   

             6/10/18 0733





Imaging





Last Impressions








Chest X-Ray 6/9/18 0500 Signed





Impressions: 





 CONCLUSION: 





 Stable left chest tube without pneumothorax. Slight increase in left basilar ai





 rspace disease.





  





 


 


Chest CT 6/6/18 0000 Signed





Impressions: 





 CONCLUSION:  





 1.  Left upper lobe malignant mass appears larger since the prior study from 1/ 2018.





 2.  Parenchymal consolidation infiltrates are seen bilaterally some of which we





 re not present previously particularly in right lung.





 3.  The right lower lobe nodule which measured 1.7 cm previously is not visuali





 zed and there are a couple of tiny subcentimeter nodules right lower lobe not c





 learly identified previously. Right middle lobe nodule is similar and not kearns





 ed.





 4.  Large left pleural effusion and a tiny right pleural effusion.





 5.  Dense vascular calcifications of coronary is an aorta and high-grade stenos





 is of celiac and SMA arteries are suspected discussed on the patient's prior CT





  angiogram of 2/2018.





  





 








Objective Remarks


GENERAL: No distress.


SKIN: No rashes, ecchymoses or lesions. Cool and dry.


HEAD: Atraumatic. Normocephalic. No temporal or scalp tenderness.


EYES: Pupils equal round and reactive. Extraocular motions intact. No scleral 

icterus. No injection or drainage. 


ENT: Nose without bleeding, purulent drainage or septal hematoma. Throat 

without erythema, tonsillar hypertrophy or exudate. Uvula midline. Airway 

patent.


NECK: Trachea midline. No JVD or lymphadenopathy. Supple, nontender, no 

meningeal signs.


CARDIOVASCULAR: Regular rate and rhythm without murmurs, gallops, or rubs. 


RESPIRATORY: Decreased breath sounds left lung base.  


GASTROINTESTINAL: Abdomen soft, non-tender, nondistended. No hepato-splenomegaly

, or palpable masses. No guarding.


MUSCULOSKELETAL: Chest tube in place. Extremities without clubbing, cyanosis, 

or edema. No joint tenderness, effusion, or edema noted. 


NEUROLOGICAL: Awake and alert. Cranial nerves II through XII intact.  Motor and 

sensory grossly within normal limits. Normal speech.


Procedures


Left Video-Assisted Thoracoscopic Surgery (VATS)


Evacuation of Pleural Effusion


PleurX catheter placement


Betadine Pleurodesis





A/P


Assessment and Plan


Poorly differentiated adenocarcinoma of the lung with recurrent pleural effusion


Chest x-ray significant for pleural effusion on the left that appears 

unchanged. Cardiothoracic surgery consult appreciated. CT showed: Left upper 

lobe malignant mass appears larger since the prior study from 1/2018; 

Parenchymal consolidation infiltrates are seen bilaterally some of which were 

not present previously particularly in right lung; The right lower lobe nodule 

which measured 1.7 cm previously is not visualized and there are a couple of 

tiny subcentimeter nodules right lower lobe not clearly identified previously; 

Large left pleural effusion and a tiny right pleural effusion. S/p VATS 

pleurodesis and Pleurx catheter placement 6/8/18 per CTS. Medical oncology 

consult appreciated.


- oxygen and nebs as needed.  Added standing nebs.


- pain control as needed with a bowel regimen.


- IS. 


- wound care and anticoagulation per surgery.


- follow up with oncology.


- PT.





Acute renal insufficiency


May be pre-renal. 


- IVFs and monitor.


- avoid nephrotoxins.





Atrial fibrillation


Patient not on anticoagulation secondary to history of GI bleed.  Rate is 

controlled.


- Continue home medications.





Seizure disorder


No evidence of seizures.


- Continue home Dilantin.





Hypertension/CAD/hypothyroidism/peripheral vascular disease


Stable. 


- Holding home aspirin for procedure.


- Continue home medications.





Anemia


Likely s/t carcinoma. 


- follow CBC.





Constipation


The pt has not had a bowel movement in a few days.


- Add Miralax to bowel regimen.





PPx: Per surgery











Fabián Silverio DO John 10, 2018 14:24

## 2018-06-11 VITALS
HEART RATE: 66 BPM | RESPIRATION RATE: 20 BRPM | SYSTOLIC BLOOD PRESSURE: 116 MMHG | TEMPERATURE: 98.3 F | DIASTOLIC BLOOD PRESSURE: 56 MMHG | OXYGEN SATURATION: 99 %

## 2018-06-11 VITALS
HEART RATE: 96 BPM | SYSTOLIC BLOOD PRESSURE: 133 MMHG | OXYGEN SATURATION: 92 % | DIASTOLIC BLOOD PRESSURE: 61 MMHG | TEMPERATURE: 97.6 F | RESPIRATION RATE: 18 BRPM

## 2018-06-11 VITALS
SYSTOLIC BLOOD PRESSURE: 104 MMHG | DIASTOLIC BLOOD PRESSURE: 51 MMHG | RESPIRATION RATE: 20 BRPM | HEART RATE: 74 BPM | TEMPERATURE: 97.9 F | OXYGEN SATURATION: 100 %

## 2018-06-11 VITALS — OXYGEN SATURATION: 99 %

## 2018-06-11 VITALS
TEMPERATURE: 98.2 F | DIASTOLIC BLOOD PRESSURE: 63 MMHG | SYSTOLIC BLOOD PRESSURE: 159 MMHG | RESPIRATION RATE: 18 BRPM | HEART RATE: 82 BPM | OXYGEN SATURATION: 95 %

## 2018-06-11 VITALS
HEART RATE: 71 BPM | OXYGEN SATURATION: 99 % | DIASTOLIC BLOOD PRESSURE: 49 MMHG | SYSTOLIC BLOOD PRESSURE: 105 MMHG | TEMPERATURE: 97.9 F | RESPIRATION RATE: 20 BRPM

## 2018-06-11 VITALS
TEMPERATURE: 98.5 F | HEART RATE: 81 BPM | OXYGEN SATURATION: 95 % | SYSTOLIC BLOOD PRESSURE: 103 MMHG | DIASTOLIC BLOOD PRESSURE: 54 MMHG | RESPIRATION RATE: 18 BRPM

## 2018-06-11 LAB
BUN SERPL-MCNC: 15 MG/DL (ref 7–18)
CALCIUM SERPL-MCNC: 8.3 MG/DL (ref 8.5–10.1)
CHLORIDE SERPL-SCNC: 104 MEQ/L (ref 98–107)
CREAT SERPL-MCNC: 0.75 MG/DL (ref 0.5–1)
GFR SERPLBLD BASED ON 1.73 SQ M-ARVRAT: 75 ML/MIN (ref 89–?)
GLUCOSE SERPL-MCNC: 161 MG/DL (ref 74–106)
HCO3 BLD-SCNC: 25.2 MEQ/L (ref 21–32)
MAGNESIUM SERPL-MCNC: 1.9 MG/DL (ref 1.5–2.5)
SODIUM SERPL-SCNC: 137 MEQ/L (ref 136–145)

## 2018-06-11 RX ADMIN — IPRATROPIUM BROMIDE AND ALBUTEROL SULFATE SCH AMPULE: .5; 3 SOLUTION RESPIRATORY (INHALATION) at 08:56

## 2018-06-11 RX ADMIN — LEVOTHYROXINE SODIUM SCH MCG: 150 TABLET ORAL at 05:36

## 2018-06-11 RX ADMIN — DOCUSATE CALCIUM SCH MG: 240 CAPSULE, LIQUID FILLED ORAL at 21:31

## 2018-06-11 RX ADMIN — Medication SCH ML: at 21:32

## 2018-06-11 RX ADMIN — HYDROCODONE BITARTRATE AND ACETAMINOPHEN PRN TAB: 5; 325 TABLET ORAL at 14:12

## 2018-06-11 RX ADMIN — Medication SCH ML: at 08:16

## 2018-06-11 RX ADMIN — AMIODARONE HYDROCHLORIDE SCH MG: 200 TABLET ORAL at 08:15

## 2018-06-11 RX ADMIN — STANDARDIZED SENNA CONCENTRATE AND DOCUSATE SODIUM SCH TAB: 8.6; 5 TABLET, FILM COATED ORAL at 21:32

## 2018-06-11 RX ADMIN — STANDARDIZED SENNA CONCENTRATE AND DOCUSATE SODIUM SCH TAB: 8.6; 5 TABLET, FILM COATED ORAL at 08:15

## 2018-06-11 RX ADMIN — METOPROLOL TARTRATE SCH MG: 25 TABLET, FILM COATED ORAL at 21:31

## 2018-06-11 RX ADMIN — PANTOPRAZOLE SCH MG: 40 TABLET, DELAYED RELEASE ORAL at 21:32

## 2018-06-11 RX ADMIN — PANTOPRAZOLE SCH MG: 40 TABLET, DELAYED RELEASE ORAL at 08:16

## 2018-06-11 RX ADMIN — DILTIAZEM HYDROCHLORIDE SCH MG: 240 CAPSULE, EXTENDED RELEASE ORAL at 21:32

## 2018-06-11 RX ADMIN — PHENYTOIN SODIUM SCH MG: 100 CAPSULE, EXTENDED RELEASE ORAL at 21:37

## 2018-06-11 RX ADMIN — ATORVASTATIN CALCIUM SCH MG: 80 TABLET, FILM COATED ORAL at 21:31

## 2018-06-11 RX ADMIN — DIGOXIN SCH MG: 125 TABLET ORAL at 08:15

## 2018-06-11 RX ADMIN — SODIUM BICARBONATE SCH MLS/HR: 84 INJECTION, SOLUTION INTRAVENOUS at 01:38

## 2018-06-11 RX ADMIN — MORPHINE SULFATE PRN MG: 2 INJECTION, SOLUTION INTRAMUSCULAR; INTRAVENOUS at 08:32

## 2018-06-11 RX ADMIN — EZETIMIBE SCH MG: 10 TABLET ORAL at 08:16

## 2018-06-11 RX ADMIN — METOPROLOL TARTRATE SCH MG: 25 TABLET, FILM COATED ORAL at 08:15

## 2018-06-11 RX ADMIN — PANTOPRAZOLE SCH MG: 40 TABLET, DELAYED RELEASE ORAL at 21:00

## 2018-06-11 NOTE — HHI.PR
Subjective


Remarks


The pt was working with PT. She was hoping to go home tomorrow. She said she'd 

need home health care. She endorsed shortness of breath with exertion. 

Discussed with nursing.





Objective


Vitals





Vital Signs








  Date Time  Temp Pulse Resp B/P (MAP) Pulse Ox O2 Delivery O2 Flow Rate FiO2


 


6/11/18 12:15 97.9 71 20 105/49 (67) 99   


 


6/11/18 08:57     99 Nasal Cannula 2.00 


 


6/11/18 08:18     96 Nasal Cannula 2.00 


 


6/11/18 08:17 98.3 66 20 116/56 (76) 99   


 


6/11/18 04:00 98.2 82 18 159/63 (95) 95   


 


6/11/18 00:00 98.5 81 18 103/54 (70) 95   


 


6/10/18 21:55      Nasal Cannula 2.00 


 


6/10/18 20:00 98.3 94 18 122/65 (84) 97   


 


6/10/18 19:36     94   


 


6/10/18 16:00 98.3 71 18 118/71 (87) 96   














I/O      


 


 6/10/18 6/10/18 6/10/18 6/11/18 6/11/18 6/11/18





 07:00 15:00 23:00 07:00 15:00 23:00


 


Intake Total  580 ml    


 


Output Total 400 ml  900 ml  662 ml 


 


Balance -400 ml 580 ml -900 ml  -662 ml 


 


      


 


Intake Oral  580 ml    


 


Output Chest Tube Drainage Total     662 ml 


 


Drainage Total 400 ml  900 ml   


 


# Voids 6 3 1   


 


# Bowel Movements  0    








Result Diagram:  


6/10/18 0733                                                                   

             6/11/18 1025





Imaging





Last Impressions








Chest X-Ray 6/9/18 0500 Signed





Impressions: 





 CONCLUSION: 





 Stable left chest tube without pneumothorax. Slight increase in left basilar ai





 rspace disease.





  





 


 


Chest CT 6/6/18 0000 Signed





Impressions: 





 CONCLUSION:  





 1.  Left upper lobe malignant mass appears larger since the prior study from 1/ 2018.





 2.  Parenchymal consolidation infiltrates are seen bilaterally some of which we





 re not present previously particularly in right lung.





 3.  The right lower lobe nodule which measured 1.7 cm previously is not visuali





 zed and there are a couple of tiny subcentimeter nodules right lower lobe not c





 learly identified previously. Right middle lobe nodule is similar and not kearns





 ed.





 4.  Large left pleural effusion and a tiny right pleural effusion.





 5.  Dense vascular calcifications of coronary is an aorta and high-grade stenos





 is of celiac and SMA arteries are suspected discussed on the patient's prior CT





  angiogram of 2/2018.





  





 








Objective Remarks


GENERAL: No distress.


SKIN: No rashes, ecchymoses or lesions. Cool and dry.


HEAD: Atraumatic. Normocephalic. No temporal or scalp tenderness.


EYES: Pupils equal round and reactive. Extraocular motions intact. No scleral 

icterus. No injection or drainage. 


ENT: Nose without bleeding, purulent drainage or septal hematoma. Throat 

without erythema, tonsillar hypertrophy or exudate. Uvula midline. Airway 

patent.


NECK: Trachea midline. No JVD or lymphadenopathy. Supple, nontender, no 

meningeal signs.


CARDIOVASCULAR: Regular rate and rhythm without murmurs, gallops, or rubs. 


RESPIRATORY: Decreased breath sounds left lung base.  


GASTROINTESTINAL: Abdomen soft, non-tender, nondistended. No hepato-splenomegaly

, or palpable masses. No guarding.


MUSCULOSKELETAL: Chest tube in place. Extremities without clubbing, cyanosis, 

or edema. No joint tenderness, effusion, or edema noted. 


NEUROLOGICAL: Awake and alert. Cranial nerves II through XII intact.  Motor and 

sensory grossly within normal limits. Normal speech.


Procedures


Left Video-Assisted Thoracoscopic Surgery (VATS)


Evacuation of Pleural Effusion


PleurX catheter placement


Betadine Pleurodesis





A/P


Assessment and Plan


Poorly differentiated adenocarcinoma of the lung with recurrent pleural effusion


Chest x-ray significant for pleural effusion on the left that appears 

unchanged. Cardiothoracic surgery consult appreciated. CT showed: Left upper 

lobe malignant mass appears larger since the prior study from 1/2018; 

Parenchymal consolidation infiltrates are seen bilaterally some of which were 

not present previously particularly in right lung; The right lower lobe nodule 

which measured 1.7 cm previously is not visualized and there are a couple of 

tiny subcentimeter nodules right lower lobe not clearly identified previously; 

Large left pleural effusion and a tiny right pleural effusion. S/p VATS 

pleurodesis and Pleurx catheter placement 6/8/18 per CTS. Medical oncology 

consult appreciated.


- oxygen and nebs as needed.  Added standing nebs.


- pain control as needed with a bowel regimen.


- IS. 


- wound care and anticoagulation per surgery. 


- follow up with oncology.


- PT.


- HHC per CT surgery.


- repeat CXR.





Acute renal insufficiency


May be pre-renal. Improved with fluids.


- avoid nephrotoxins.





Atrial fibrillation


Patient not on anticoagulation secondary to history of GI bleed.  Rate is 

controlled.


- Continue home medications.





Seizure disorder


No evidence of seizures.


- Continue home Dilantin.





Hypertension/CAD/hypothyroidism/peripheral vascular disease


Stable. 


- Holding home aspirin for procedure. Resume upon d/c if OK with surgery.


- Continue home medications.





Anemia


Likely s/t carcinoma. 


- follow CBC.





Constipation


The pt has not had a bowel movement in a few days.


- Add Miralax to bowel regimen.





PPx: Per surgery


Discharge Planning


Hopefully d/c home with C in .











Fabián Silverio DO Jun 11, 2018 15:48

## 2018-06-11 NOTE — HHI.DCPOC
Discharge Care Plan


Diagnosis:  


(1) Pleural effusion


(2) 3. PleurX catheter placement. 


Additional Problems


Incentive spirometry Q1 hr x 10, while awake, also use acapella device hourly 

whole 





Chest wall precautions: NO pushing or pulling, ( pt must use chest pillow  

support chest with all activities and with coughing 








Daily incision care:  ok to shower daily, no tub bath.  Wash all incisions with 

liquid dial soap, clean wash cloth to each site, rinse and pat dry.  Observe 

for any signs of infection, such as drainage which is dark yellow, gray, green 

or foul smelling.  Immediately report to the surgeon any drainage from the 

chest incision, or legs, and for any abnormal drainage from the chest tube 

sites.  Notify surgeon if any temp >101.5 degrees F.  When specialty dressing 

removed/ or if you do not have one, continue to shower daily as above, then 

rinse and pat incision dry and paint with betadine daily x 5 days.  Allow steri 

strips to fall off if you have any.  Avoid lotions, creams, salves, oils, etc. 

for the first month   / pt has pleurx cath which is to be drained daily x 10 

days, then if <100cc then every other day / 





, please obtain   PA & Lat CXR in 2 weeks, results to Dr. Balderas  ( 

prescription will be given) (Fax: [phone]) (Tele: [phone]) , 





F/U appointment: as per [place] instructions: PCP in 2 weeks, CV surgeon 2 weeks,  


Pulmonologist 3-4 weeks





For any questions regarding incisions/ dressing / meds / post op care or above 

                            


Symptoms, 





Monday Friday 8am-5pm   Heart & Vascular Surgery Office


(  Dr. Balderas & Dr. Ash), [phone]





After Hours / Nights (5pm -8am) Weekends and Holidays 


Please call [facility] ([facility]) Charge Nurse 


[phone]


Goals to Promote Your Health


* To prevent worsening of your condition and complications


* To maintain your health at the optimal level


Directions to Meet Your Goals


*** Take your medications as prescribed


*** Follow your dietary instruction


*** Follow activity as directed








*** Keep your appointments as scheduled


*** Take your immunizations and boosters as scheduled


*** If your symptoms worsen call your PCP, if no PCP go to Urgent Care Center 

or Emergency Room***


*** Smoking is Dangerous to Your Health. Avoid second hand smoke***


***Call the 24-hour hour crisis hotline for domestic abuse at 1-946.390.1188***











Terwilliger,Jacqueline R. ARNP Jun 11, 2018 17:37

## 2018-06-11 NOTE — RADRPT
EXAM DATE:  6/11/2018 4:30 PM EDT

AGE/SEX:        78 years / Female



INDICATIONS:  Shortness of breath.



CLINICAL DATA:  This is the patient's initial encounter. Patient reports that signs and symptoms have
 been present for 1 day and indicates a pain score of 0/10. 

                                                                          

MEDICAL/SURGICAL HISTORY:       . Carcinoma, lung. Chronic obstructive pulmonary disease. Aneurysm, i
ntracranial. Seizures, cardiovascular disease, hypertension.  . Tubal ligation.



COMPARISON:      [facility], CHEST SINGLE AP, 6/9/2018.  . 





FINDINGS:  

A small left chest tube is again noted and unchanged. There is a tiny left apical pneumothorax measur
ing 6 mm.  A small right pleural effusion is noted. Bibasilar atelectasis and/or infiltrates are note
d. The heart is stable.



CONCLUSION: 

1.  Tiny left apical pneumothorax measuring 6 mm.

2.  Bibasilar atelectasis and/or infiltrates.

3.  Small right pleural effusion.



Electronically signed by: Nathanael John MD  6/11/2018 4:34 PM EDT

## 2018-06-11 NOTE — PD.CAR.PN
CVT Progress Note


Subjective/Hospital Course:


 78-year-old female with history of poorly differentiated adenocarcinoma of the 

lung.  She has completed radiation therapy to the lung mass.  Her last one was 

in February.  She is followed by Dr. Da Lyles, Dr. Familia Calzada.  Per the 

oncology note, she recently underwent a PET scan.  The left upper lobe was 

slightly larger.  There is a small nodule in the right lower lobe, which was 

also slightly larger.  She has had recurrent pleural effusions in the left lung 

and 


has undergone multiple thoracenteses.  Per the note she has preferred not to be 

treated with systemic therapy.  She had a recent chest x-ray that showed about 

250 mL in the left lung; however, she came in to the emergency room with 

increasing shortness of breath, recurrent left pleural effusion.  CT chest 

shows large left pleural effusion with consolidation or compressive collapse.  

She also still has a 2.4 cm spiculated mass in the left upper lobe.  The left 

upper lobe malignant mass appears larger than from 01/2018.  She has a right 

lower lobe nodule.  She has a significant dense vascular calcification of the 

coronary arteries.  We were consulted to evaluate for right video-assisted 

thoracoscopy, pleurodesis and placement of PleurX catheter.


 


PAST MEDICAL HISTORY: Other includes recurrent pleural effusions, poorly 

differentiated adenocarcinoma of the lung, COPD, coronary artery disease, 

seizure disorder, hypertension, hypothyroidism, history of DVT, atrial 


fibrillation.  She is not on anticoagulation secondary to gastrointestinal 

bleed in the past.  Peripheral vascular disease and iron deficiency anemia.


 


6/8





1. Left Video-Assisted Thoracoscopic Surgery (VATS). 


2. Evacuation of Pleural Effusion. 


3. PleurX catheter placement. 


4. Betadine Pleurodesis. 


5. Intercostal Nerve Block





6/11


pleurx cath draining serous drainage 


pleura vac removed, ok to drain with pleurx drainage system daily 


will need [facility] to assit in draining daily until <100cc/ then ok for every other 

day 


will need follow up in our office in 2 weeks


Objective:


GENERAL: 


SKIN: Warm and dry.  incisions x 2 intact to left postero lateral chest wall 


HEAD: Normocephalic.


EYES: No scleral icterus. No injection or drainage. 


NECK: Supple, trachea midline. No JVD or lymphadenopathy.


CARDIOVASCULAR: Regular rate and rhythm without murmurs, gallops, or rubs. 


RESPIRATORY: Breath sounds equal bilaterally. No accessory muscle use.


diminished left lower lobe 


GASTROINTESTINAL: Abdomen soft, non-tender, nondistended. 


MUSCULOSKELETAL: No cyanosis, or edema. 


BACK: Nontender without obvious deformity. No CVA tenderness.








Vital Signs








  Date Time  Temp Pulse Resp B/P (MAP) Pulse Ox O2 Delivery O2 Flow Rate FiO2


 


6/11/18 16:12 97.9 74 20 104/51 (68) 100   


 


6/11/18 12:15 97.9 71 20 105/49 (67) 99   


 


6/11/18 08:57     99 Nasal Cannula 2.00 


 


6/11/18 08:18     96 Nasal Cannula 2.00 


 


6/11/18 08:17 98.3 66 20 116/56 (76) 99   


 


6/11/18 04:00 98.2 82 18 159/63 (95) 95   


 


6/11/18 00:00 98.5 81 18 103/54 (70) 95   


 


6/10/18 21:55      Nasal Cannula 2.00 


 


6/10/18 20:00 98.3 94 18 122/65 (84) 97   


 


6/10/18 19:36     94   








Labs:





Laboratory Tests








Test


  6/11/18


10:25


 


Blood Urea Nitrogen


  15 MG/DL


(7-18)


 


Creatinine


  0.75 MG/DL


(0.50-1.00)


 


Random Glucose


  161 MG/DL


()


 


Calcium Level


  8.3 MG/DL


(8.5-10.1)


 


Magnesium Level


  1.9 MG/DL


(1.5-2.5)


 


Sodium Level


  137 MEQ/L


(136-145)


 


Potassium Level


  4.5 MEQ/L


(3.5-5.1)


 


Chloride Level


  104 MEQ/L


()


 


Carbon Dioxide Level


  25.2 MEQ/L


(21.0-32.0)


 


Anion Gap 8 MEQ/L (5-15) 


 


Estimat Glomerular Filtration


Rate 75 ML/MIN


(>89)








Result Diagram:  


6/10/18 0733                                                                   

             6/11/18 1025








(1) Paroxysmal atrial fibrillation


(2) Iron deficiency anemia


(3) CAD (coronary artery disease)


(4) Mass of left lung


(5) Lung cancer


(6) 3. PleurX catheter placement. 


Plan:  ok to drain daily with pleurx drain system 














Terwilliger,Jacqueline R. ARNP Jun 11, 2018 17:35

## 2018-06-12 VITALS
HEART RATE: 68 BPM | OXYGEN SATURATION: 95 % | DIASTOLIC BLOOD PRESSURE: 55 MMHG | RESPIRATION RATE: 18 BRPM | SYSTOLIC BLOOD PRESSURE: 135 MMHG | TEMPERATURE: 97.6 F

## 2018-06-12 VITALS — DIASTOLIC BLOOD PRESSURE: 52 MMHG | SYSTOLIC BLOOD PRESSURE: 101 MMHG | HEART RATE: 79 BPM

## 2018-06-12 VITALS
DIASTOLIC BLOOD PRESSURE: 62 MMHG | SYSTOLIC BLOOD PRESSURE: 134 MMHG | HEART RATE: 73 BPM | RESPIRATION RATE: 18 BRPM | TEMPERATURE: 98 F | OXYGEN SATURATION: 97 %

## 2018-06-12 VITALS
SYSTOLIC BLOOD PRESSURE: 121 MMHG | RESPIRATION RATE: 18 BRPM | OXYGEN SATURATION: 95 % | HEART RATE: 18 BPM | DIASTOLIC BLOOD PRESSURE: 76 MMHG | TEMPERATURE: 98.2 F

## 2018-06-12 VITALS — OXYGEN SATURATION: 97 %

## 2018-06-12 RX ADMIN — DIGOXIN SCH MG: 125 TABLET ORAL at 09:36

## 2018-06-12 RX ADMIN — LEVOTHYROXINE SODIUM SCH MCG: 150 TABLET ORAL at 05:25

## 2018-06-12 RX ADMIN — Medication SCH ML: at 09:37

## 2018-06-12 RX ADMIN — HYDROCODONE BITARTRATE AND ACETAMINOPHEN PRN TAB: 5; 325 TABLET ORAL at 09:37

## 2018-06-12 RX ADMIN — STANDARDIZED SENNA CONCENTRATE AND DOCUSATE SODIUM SCH TAB: 8.6; 5 TABLET, FILM COATED ORAL at 09:36

## 2018-06-12 RX ADMIN — EZETIMIBE SCH MG: 10 TABLET ORAL at 09:37

## 2018-06-12 RX ADMIN — PANTOPRAZOLE SCH MG: 40 TABLET, DELAYED RELEASE ORAL at 09:37

## 2018-06-12 RX ADMIN — AMIODARONE HYDROCHLORIDE SCH MG: 200 TABLET ORAL at 09:36

## 2018-06-12 RX ADMIN — METOPROLOL TARTRATE SCH MG: 25 TABLET, FILM COATED ORAL at 09:35

## 2018-06-12 NOTE — HHI.DS
__________________________________________________





Discharge Summary


Admission Date


Jun 5, 2018 at 18:21


Discharge Date:  Jun 12, 2018


Admitting Diagnosis





Pleural effusion





(1) 3. PleurX catheter placement. 


Diagnosis:  Principal





(2) Dyspnea


ICD Code:  R06.00 - Dyspnea, unspecified


Diagnosis:  Principal


Status:  Acute


(3) Lung cancer


ICD Code:  C34.90 - Lung cancer


Diagnosis:  Principal


Status:  Acute


(4) Pleural effusion


ICD Code:  J90 - Pleural effusion, not elsewhere classified


Diagnosis:  Principal


Status:  Acute


Procedures


Left Video-Assisted Thoracoscopic Surgery (VATS)


Evacuation of Pleural Effusion


PleurX catheter placement


Betadine Pleurodesis


Brief History - From Admission


78-year-old female with poorly differentiated adenocarcinoma of the lung and 

recurrent pleural effusions presents to the emergency department for the 

evaluation of increasing shortness of breath.  The patient has a past medical 

history significant for COPD, CAD, seizure disorder, hypertension, 

hypothyroidism, history of DVT, atrial fibrillation (not on anticoagulation 

secondary to GI bleeding), peripheral vascular disease and iron deficiency 

anemia.  She has completed S/P RT treatments to the lung mass and has declined 

any further treatment at this time.  The patient reports increasing shortness 

of breath since last week, worse than baseline.  The patient called her 

oncologist office who recommended going to the emergency department for further 

evaluation with possible admission for pleurodesis.  The patient denies any 

chest pain.  No abdominal pain.  No nausea/vomiting/diarrhea.  No lateralizing 

signs/symptoms.  No fever/chills.


CBC/BMP:  


6/10/18 0733                                                                   

             6/11/18 1025





Significant Findings





Laboratory Tests








Test


  6/10/18


07:33 6/11/18


10:25


 


Red Blood Count


  3.62 MIL/MM3


(4.00-5.30) 


 


 


Hemoglobin


  9.6 GM/DL


(11.6-15.3) 


 


 


Hematocrit


  30.6 %


(35.0-46.0) 


 


 


Mean Corpuscular Hemoglobin


  26.6 PG


(27.0-34.0) 


 


 


Mean Corpuscular Hemoglobin


Concent 31.4 %


(32.0-36.0) 


 


 


Red Cell Distribution Width


  21.5 %


(11.6-17.2) 


 


 


Blood Urea Nitrogen


  25 MG/DL


(7-18) 


 


 


Creatinine


  1.15 MG/DL


(0.50-1.00) 


 


 


Calcium Level


  8.1 MG/DL


(8.5-10.1) 8.3 MG/DL


(8.5-10.1)


 


Estimat Glomerular Filtration


Rate 46 ML/MIN


(>89) 75 ML/MIN


(>89)


 


Random Glucose


  


  161 MG/DL


()








Imaging





Last Impressions








Chest X-Ray 6/11/18 0000 Signed





Impressions: 





 CONCLUSION: 





 1.  Tiny left apical pneumothorax measuring 6 mm.





 2.  Bibasilar atelectasis and/or infiltrates.





 3.  Small right pleural effusion.





  





 


 


Chest CT 6/6/18 0000 Signed





Impressions: 





 CONCLUSION:  





 1.  Left upper lobe malignant mass appears larger since the prior study from 1/ 2018.





 2.  Parenchymal consolidation infiltrates are seen bilaterally some of which we





 re not present previously particularly in right lung.





 3.  The right lower lobe nodule which measured 1.7 cm previously is not visuali





 zed and there are a couple of tiny subcentimeter nodules right lower lobe not c





 learly identified previously. Right middle lobe nodule is similar and not kearns





 ed.





 4.  Large left pleural effusion and a tiny right pleural effusion.





 5.  Dense vascular calcifications of coronary is an aorta and high-grade stenos





 is of celiac and SMA arteries are suspected discussed on the patient's prior CT





  angiogram of 2/2018.





  





 








PE at Discharge


GENERAL: No distress.


SKIN: No rashes, ecchymoses or lesions. Cool and dry.


HEAD: Atraumatic. Normocephalic. No temporal or scalp tenderness.


EYES: Pupils equal round and reactive. Extraocular motions intact. No scleral 

icterus. No injection or drainage. 


ENT: Nose without bleeding, purulent drainage or septal hematoma. Throat 

without erythema, tonsillar hypertrophy or exudate. Uvula midline. Airway 

patent.


NECK: Trachea midline. No JVD or lymphadenopathy. Supple, nontender, no 

meningeal signs.


CARDIOVASCULAR: Regular rate and rhythm without murmurs, gallops, or rubs. 


RESPIRATORY: Decreased breath sounds left lung base.  


GASTROINTESTINAL: Abdomen soft, non-tender, nondistended. No hepato-splenomegaly

, or palpable masses. No guarding.


MUSCULOSKELETAL: Chest tube in place. Extremities without clubbing, cyanosis, 

or edema. No joint tenderness, effusion, or edema noted. 


NEUROLOGICAL: Awake and alert. Cranial nerves II through XII intact.  Motor and 

sensory grossly within normal limits. Normal speech.


Pt update on day of discharge


The patient was sitting up in bed.  She said that her ride is not here yet.  

She had no acute complaints.  She said she had to have her catheter drained.  

Discussed with nursing.


Hospital Course


Poorly differentiated adenocarcinoma of the lung with recurrent pleural effusion


Chest x-ray significant for pleural effusion on the left that appears 

unchanged. Cardiothoracic surgery was consulted. CT showed: Left upper lobe 

malignant mass appears larger since the prior study from 1/2018; Parenchymal 

consolidation infiltrates are seen bilaterally some of which were not present 

previously particularly in right lung; The right lower lobe nodule which 

measured 1.7 cm previously is not visualized and there are a couple of tiny 

subcentimeter nodules right lower lobe not clearly identified previously; Large 

left pleural effusion and a tiny right pleural effusion. S/p VATS pleurodesis 

and Pleurx catheter placement on 6/8/18 per CTS. Medical oncology was 

consulted. The pt received oxygen and nebs as needed. We added standing nebs. 

She received pain control as needed with a bowel regimen. She utilized 

incentive spirometry. The pt received wound care and anticoagulation per 

surgery. She will follow up with oncology as an outpt. She worked with PT. 

Repeat CXR revealed a tiny left sided pneumothorax. CT surgery was made aware 

and cleared the pt for discharge. She will follow up with surgery as an outpt. 

She will have a repeat CXR as an outpt. Home Health Care nursing was arranged 

by case management. The pt's baby ASA will be resumed.


Pt Condition on Discharge:  Stable


Discharge Disposition:  Disch w/ Home Health Serv


Discharge Time:  > 30 minutes


Discharge Instructions


DIET: Follow Instructions for:  Heart Healthy Diet


Activities you can perform:  Weight Bearing as Qasim


Other Activity Instructions:  


Per surgery


Follow up Referrals:  


Oncology/Hematology - 1 Week


PCP Follow-up - 1 Week


Surgical - 2 Weeks with Terwilliger,Jacqueline R. ARNP





New Orders:  


X-RAY CHEST PA & LAT - 2 Weeks





Continued Medications:  


Amiodarone (Amiodarone) 200 Mg Tab


200 MG PO DAILY for Regulate Heart Beat, #30 TAB 0 Refills





Aspirin (Aspirin Low Strength) 81 Mg Chew


81 MG CHEW DAILY for Stent placement, #30 EA 0 Refills





Atorvastatin (Lipitor) 80 Mg Tab


80 MG PO HS for Cholesterol Management, #30 TAB 0 Refills





Cholecalciferol (Vitamin D-3) 1,000 Unit Cap


1000 TAB PO DAILY





Digoxin (Digoxin) 0.125 Mg Tab


0.125 MG PO DAILY for afib, #30 TAB





Diltiazem ER 24 HR (Cardizem LA) 240 Mg Analisa


240 MG PO HS, #30 TAB 0 Refills





Ezetimibe (Zetia) 10 Mg Tab


10 MG PO DAILY, #30 TAB 0 Refills





Levothyroxine (Levothyroxine) 150 Mcg Tab


150 MCG PO DAILY for Thyroid, #30 TAB 0 Refills





Metoprolol Tartrate (Lopressor) 50 Mg Tab


25 MG PO BID, #30 TAB 0 Refills





Multiple Vitamins W/ Minerals (Multi For Her) 1 Tab Tab


1 TAB PO DAILY





Pantoprazole (Protonix) 40 Mg Tab


40 MG PO BID for Ulcer Prevention for 30 Days, TAB 0 Refills





Phenytoin Extended (Dilantin) 100 Mg Cap


400 MG PO HS for Control Seizures, #180 CAP 0 Refills

















Fabián Silverio DO Jun 12, 2018 10:31

## 2018-06-12 NOTE — HHI.FF
Face to Face Verification


Diagnosis:  


(1) Dyspnea


(2) 3. PleurX catheter placement. 


(3) Lung cancer


(4) Pleural effusion


Home Health Nursing








Order: Medical education





 Signs/symptoms of disease process





 Medication education-adverse effect





 Wound care and dressing changes





 Nursing assessment with vital signs








Instructions:


Daily incision care:  ok to shower daily, no tub bath.  Wash all incisions with 

liquid dial soap, clean wash cloth to each site, rinse and pat dry.  Observe 

for any signs of infection, such as drainage which is dark yellow, gray, green 

or foul smelling.  Immediately report to the surgeon any drainage from the 

chest incision, or legs, and for any abnormal drainage from the chest tube 

sites.  Notify surgeon if any temp >101.5 degrees F.  When specialty dressing 

removed/ or if you do not have one, continue to shower daily as above, then 

rinse and pat incision dry and paint with betadine daily x 5 days.  Allow steri 

strips to fall off if you have any.  Avoid lotions, creams, salves, oils, etc. 

for the first month   / pt has pleurx cath which is to be drained daily x 10 

days, then if <100cc then every other day





Incentive spirometry Q1 hr x 10, while awake, also use acapella device hourly 

whole 





Chest wall precautions: NO pushing or pulling, ( pt must use chest pillow  

support chest with all activities and with coughing











I have seen patient Annalisa Ruiz on 6/12/18. My clinical findings support 

the need for the requested home health care services because:








 Ltd mobility - disease progression





 Patient has SOB





 Deconditioned w/ increased weakness





 Limited ability to care for self














I certify that my clinical findings support that this patient is homebound 

because:








 Post-op weakness





 Unsafe to leave home unassisted

















Fabián Silverio DO Jun 12, 2018 09:01